# Patient Record
Sex: MALE | Race: WHITE | NOT HISPANIC OR LATINO | Employment: OTHER | URBAN - METROPOLITAN AREA
[De-identification: names, ages, dates, MRNs, and addresses within clinical notes are randomized per-mention and may not be internally consistent; named-entity substitution may affect disease eponyms.]

---

## 2018-06-14 ENCOUNTER — OFFICE VISIT CONVERTED (OUTPATIENT)
Dept: UROLOGY | Facility: CLINIC | Age: 68
End: 2018-06-14
Attending: UROLOGY

## 2018-06-14 ENCOUNTER — CONVERSION ENCOUNTER (OUTPATIENT)
Dept: UROLOGY | Facility: CLINIC | Age: 68
End: 2018-06-14

## 2020-10-22 ENCOUNTER — HOSPITAL ENCOUNTER (OUTPATIENT)
Dept: UROLOGY | Facility: CLINIC | Age: 70
Discharge: HOME | End: 2020-10-22
Attending: UROLOGY

## 2020-10-22 ENCOUNTER — OFFICE VISIT CONVERTED (OUTPATIENT)
Dept: UROLOGY | Facility: CLINIC | Age: 70
End: 2020-10-22
Attending: UROLOGY

## 2020-10-23 LAB — PSA SERPL-MCNC: 3.04 NG/ML (ref 0–4)

## 2020-10-29 ENCOUNTER — HOSPITAL ENCOUNTER (OUTPATIENT)
Dept: OTHER | Facility: HOSPITAL | Age: 70
Discharge: HOME | End: 2020-10-29
Attending: PSYCHIATRY & NEUROLOGY

## 2020-10-29 LAB
25(OH)D3 SERPL-MCNC: 26.6 NG/ML (ref 30–100)
ALBUMIN SERPL-MCNC: 4.4 G/DL (ref 3.5–5)
ALBUMIN/GLOB SERPL: 1.3 {RATIO} (ref 1.4–2.6)
ALP SERPL-CCNC: 106 U/L (ref 56–155)
ALT SERPL-CCNC: 120 U/L (ref 10–40)
ANION GAP SERPL CALC-SCNC: 18 MMOL/L (ref 8–19)
AST SERPL-CCNC: 136 U/L (ref 15–50)
BASOPHILS # BLD AUTO: 0.03 10*3/UL (ref 0–0.2)
BASOPHILS NFR BLD AUTO: 0.5 % (ref 0–3)
BILIRUB SERPL-MCNC: 0.9 MG/DL (ref 0.2–1.3)
BUN SERPL-MCNC: 12 MG/DL (ref 5–25)
BUN/CREAT SERPL: 20 {RATIO} (ref 6–20)
CALCIUM SERPL-MCNC: 9.3 MG/DL (ref 8.7–10.4)
CHLORIDE SERPL-SCNC: 101 MMOL/L (ref 99–111)
CHOLEST SERPL-MCNC: 234 MG/DL (ref 107–200)
CHOLEST/HDLC SERPL: 4.6 {RATIO} (ref 3–6)
CONV ABS IMM GRAN: 0.03 10*3/UL (ref 0–0.2)
CONV CO2: 26 MMOL/L (ref 22–32)
CONV IMMATURE GRAN: 0.5 % (ref 0–1.8)
CONV TOTAL PROTEIN: 7.7 G/DL (ref 6.3–8.2)
CREAT UR-MCNC: 0.6 MG/DL (ref 0.7–1.2)
DEPRECATED RDW RBC AUTO: 48.7 FL (ref 35.1–43.9)
EOSINOPHIL # BLD AUTO: 0.17 10*3/UL (ref 0–0.7)
EOSINOPHIL # BLD AUTO: 2.6 % (ref 0–7)
ERYTHROCYTE [DISTWIDTH] IN BLOOD BY AUTOMATED COUNT: 12 % (ref 11.6–14.4)
EST. AVERAGE GLUCOSE BLD GHB EST-MCNC: 97 MG/DL
GFR SERPLBLD BASED ON 1.73 SQ M-ARVRAT: >60 ML/MIN/{1.73_M2}
GLOBULIN UR ELPH-MCNC: 3.3 G/DL (ref 2–3.5)
GLUCOSE SERPL-MCNC: 85 MG/DL (ref 70–99)
HBA1C MFR BLD: 5 % (ref 3.5–5.7)
HCT VFR BLD AUTO: 55.4 % (ref 42–52)
HDLC SERPL-MCNC: 51 MG/DL (ref 40–60)
HGB BLD-MCNC: 18.9 G/DL (ref 14–18)
LDLC SERPL CALC-MCNC: 155 MG/DL (ref 70–100)
LYMPHOCYTES # BLD AUTO: 1.86 10*3/UL (ref 1–5)
LYMPHOCYTES NFR BLD AUTO: 28.7 % (ref 20–45)
MCH RBC QN AUTO: 36.8 PG (ref 27–31)
MCHC RBC AUTO-ENTMCNC: 34.1 G/DL (ref 33–37)
MCV RBC AUTO: 107.8 FL (ref 80–96)
MONOCYTES # BLD AUTO: 0.62 10*3/UL (ref 0.2–1.2)
MONOCYTES NFR BLD AUTO: 9.6 % (ref 3–10)
NEUTROPHILS # BLD AUTO: 3.77 10*3/UL (ref 2–8)
NEUTROPHILS NFR BLD AUTO: 58.1 % (ref 30–85)
NRBC CBCN: 0 % (ref 0–0.7)
OSMOLALITY SERPL CALC.SUM OF ELEC: 291 MOSM/KG (ref 273–304)
PLATELET # BLD AUTO: 162 10*3/UL (ref 130–400)
PMV BLD AUTO: 10.3 FL (ref 9.4–12.4)
POTASSIUM SERPL-SCNC: 3.8 MMOL/L (ref 3.5–5.3)
RBC # BLD AUTO: 5.14 10*6/UL (ref 4.7–6.1)
SODIUM SERPL-SCNC: 141 MMOL/L (ref 135–147)
T4 FREE SERPL-MCNC: 1 NG/DL (ref 0.9–1.8)
TRIGL SERPL-MCNC: 141 MG/DL (ref 40–150)
TSH SERPL-ACNC: 1.88 M[IU]/L (ref 0.27–4.2)
VLDLC SERPL-MCNC: 28 MG/DL (ref 5–37)
WBC # BLD AUTO: 6.48 10*3/UL (ref 4.8–10.8)

## 2020-10-30 ENCOUNTER — HOSPITAL ENCOUNTER (OUTPATIENT)
Dept: CT IMAGING | Facility: HOSPITAL | Age: 70
Discharge: HOME | End: 2020-10-30
Attending: PSYCHIATRY & NEUROLOGY

## 2020-10-30 LAB
B BURGDOR IGG+IGM SER-ACNC: <0.91 ISR (ref 0–0.9)
DSDNA AB SER-ACNC: NEGATIVE [IU]/ML
ENA AB SER IA-ACNC: NEGATIVE {RATIO}

## 2020-12-17 ENCOUNTER — HOSPITAL ENCOUNTER (OUTPATIENT)
Dept: CARDIOLOGY | Facility: HOSPITAL | Age: 70
Discharge: HOME | End: 2020-12-17
Attending: PSYCHIATRY & NEUROLOGY

## 2021-05-13 NOTE — PROGRESS NOTES
Progress Note      Patient Name: Lacne Brambila   Patient ID: [id number]   Sex: Male   YOB: 1950    Primary Care Provider: [facility]    Visit Date: October 22, 2020    Provider: Lisa Otoole MD   Location: [facility]   Location Address: [address] [place] [address], [address] [place] [address]  [id number]   Location Phone: [phone]          Chief Complaint  · Recheck prostate  · Annual Prostate visit      History Of Present Illness  Lance Brambila is a 70 year old /White male who comes in for a scheduled follow-up visit for BPH.   His last PSA was 2.37 on 6/14/18 , AUA 4/35. Patient has nocturia 2 or 3 times in the stream is medium. Getting up at night does not bother him much. Daytime is doing fine. He has no urgency. Sometimes he has dysuria but no gross hematuria. Nobody in the family has prostate cancer. No history of urinary tract infection. History of kidney stones . Patient has had 2 episodes of syncopal attacks.       Past Medical History  Knee Medial Meniscus Tear; Aftercare following surgery; Left Knee Scope; BPH without urinary obstruction; CAD (coronary artery disease); MI (myocardial infarction); Nocturia; Post Musc/Ske Surgery-Left knee scope; PUD (peptic ulcer disease); Renal calculus or stone         Past Surgical History  Cardiac Catherization; cardiac stents; Colonoscopy; Cystoscopy; Holmium Lasertripsy; Inguinal Hernia Repair         Medication List  aspirin 81 mg oral tablet,delayed release (DR/EC); Fish Oil 1,000 mg (120 mg-180 mg) oral capsule         Allergy List  NO KNOWN DRUG ALLERGIES         Family Medical History  Cancer, Unspecified; Renal Calculus; Family history of certain chronic disabling diseases; arthritis         Social History  Alcohol (Current every day); lives with spouse; ; Recreational Drug Use (Never); Retired [employer]; Tobacco (Former); Unemployed         Review of Systems  · Constitutional  o Denies  o : fever, headache,  "chills  · Eyes  o Denies  o : eye pain, double vision, blurred vision  · HENT  o Denies  o : sinus problems, sore throat, ear infection  · Cardiovascular  o Denies  o : chest pain, high blood pressure, varicosities  · Respiratory  o Denies  o : shortness of breath, wheezing, frequent cough  · Gastrointestinal  o Denies  o : nausea, vomiting, heartburn, indigestion, abdominal pain  · Genitourinary  o Denies  o : urgency, frequency, urinary retention, painful urination  · Integument  o Denies  o : rash, itching, boils  · Neurologic  o Denies  o : tingling or numbness, tremors, dizzy spells  · Musculoskeletal  o Denies  o : joint pain, neck pain, back pain  · Endocrine  o Denies  o : cold intolerance, heat intolerance, tired, excessive thirst, sluggish  · Psychiatric  o Admits  o : feels satisfied with life  o Denies  o : severe depression, concerns with hurting themselves  · Heme-Lymph  o Denies  o : swollen glands, blood clotting problems  · Allergic-Immunologic  o Denies  o : sinus allergy symptoms, hay fever      Vitals  Date Time BP Position Site L\R Cuff Size HR RR TEMP (F) WT  HT  BMI kg/m2 BSA m2 O2 Sat FR L/min FiO2 HC       10/22/2020 09:40 /92 Sitting    97 - R  97.6 190lbs 0oz 5'  10\" 27.26 2.06             Physical Examination  · Constitutional  o Appearance  o : Well nourished, well developed patient in no acute distress. Ambulating without difficulty.  · Neck  o Thyroid  o : gland size normal, nontender, no nodules or masses present on palpation  · Respiratory  o Respiratory Effort  o : breathing unlabored  o Inspection of Chest  o : normal appearance, no retractions  o Auscultation of Lungs  o : normal breath sounds throughout  · Cardiovascular  o Heart  o :   § Auscultation of Heart  § : regular rate and rhythm, no murmurs, gallops or rubs  o Peripheral Vascular System  o : No abnormalities  · Gastrointestinal  o Abdominal Examination  o : abdomen nontender to palpation, tone normal without " rigidity or guarding, no masses present, abdominal contour scaphoid  o Liver and spleen  o : no abnormalities  o Hernias  o : absent   · Genitourinary  o Bladder  o : no abnormalities  o Penis  o : Normal appearance without lesion, discharge or masses.  o Urethral Meatus  o : no abnormalities  o Scrotum and Scrotal Contents  o :   § Scrotum  § : no abnormalities  § Epididymides  § : no abnormalities  § Testes  § : no abnormalities  o Digital Rectal Examination  o :   § Prostate  § : nontender to palpation, size normal, no nodules present, consistency normal  · Lymphatic  o Groin  o : no lymphadenopathy present, normal size  · Skin and Subcutaneous Tissue  o General Inspection  o : no lesions present, no areas of discoloration, skin turgor normal, texture normal  · Neurologic  o Mental Status Examination  o : grossly oriented to person, place and time  o Gait and Station  o : normal gait, able to stand without difficulty  · Psychiatric  o Mood and Affect  o : mood normal, affect appropriate      Figure 1.0: Pain Rating Scale-Creede         Results  · In-Office Procedures  o Lab procedure  § Automated dipstick urinalysis with microscopy (63471)   § Color Ur: Yellow   § Clarity Ur: Clear   § Glucose Ur Ql Strip: Negative   § Bilirub Ur Ql Strip: Negative   § Ketones Ur Ql Strip: Negative   § Sp Gr Ur Qn: 1.020   § Hgb Ur Ql Strip: Trace-Lysed   § pH Ur-LsCnc: 8.0   § Prot Ur Ql Strip: 30 mg/dL   § Urobilinogen Ur Strip-mCnc: 1.0 E.U./dL   § Nitrite Ur Ql Strip: Negative   § WBC Est Ur Ql Strip: Negative   § RBC UrnS Qn HPF: 0   § WBC UrnS Qn HPF: 0   § Bacteria UrnS Qn HPF: 0   § Crystals UrnS Qn HPF: 1-2   § Epithelial Cells (non renal): 0 /HPF  § Epithelial Cells (renal): 0       Assessment  · Prostate Cancer Screening     V76.44/Z12.5  · Renal calculus or stone     592.0/N20.0  · Syncopal episodes     780.2/R55  · Hypertension     401.9/I10      Plan  · Orders  o PSA Screening, Ultrasensitive, MEDICARE [facility] () -  V76.44/Z12.5 - 10/22/2020  · Instructions  o Refer to Dr. West for evaluation of syncopal attacks            Electronically Signed by: Cristal Mars MA -Author on October 22, 2020 11:31:54 AM

## 2021-05-14 VITALS
SYSTOLIC BLOOD PRESSURE: 151 MMHG | HEART RATE: 97 BPM | HEIGHT: 70 IN | DIASTOLIC BLOOD PRESSURE: 92 MMHG | WEIGHT: 190 LBS | TEMPERATURE: 97.6 F | BODY MASS INDEX: 27.2 KG/M2

## 2021-05-16 VITALS
HEART RATE: 79 BPM | HEIGHT: 70 IN | WEIGHT: 198 LBS | TEMPERATURE: 98.4 F | SYSTOLIC BLOOD PRESSURE: 134 MMHG | BODY MASS INDEX: 28.35 KG/M2 | DIASTOLIC BLOOD PRESSURE: 80 MMHG

## 2021-07-19 PROBLEM — R35.1 NOCTURIA: Status: ACTIVE | Noted: 2018-06-14

## 2021-07-19 PROBLEM — N40.0 BPH WITHOUT URINARY OBSTRUCTION: Status: ACTIVE | Noted: 2018-06-14

## 2021-07-19 PROBLEM — R55 SYNCOPAL EPISODES: Status: ACTIVE | Noted: 2020-10-22

## 2021-07-19 PROBLEM — I25.10 ATHEROSCLEROSIS OF CORONARY ARTERY: Status: ACTIVE | Noted: 2021-07-19

## 2021-07-19 PROBLEM — N20.0 RENAL CALCULUS OR STONE: Status: ACTIVE | Noted: 2021-07-19

## 2021-07-19 PROBLEM — K27.9 PUD (PEPTIC ULCER DISEASE): Status: ACTIVE | Noted: 2021-07-19

## 2021-09-15 ENCOUNTER — OFFICE VISIT (OUTPATIENT)
Dept: UROLOGY | Facility: CLINIC | Age: 71
End: 2021-09-15

## 2021-09-15 VITALS
HEART RATE: 73 BPM | TEMPERATURE: 97.3 F | DIASTOLIC BLOOD PRESSURE: 88 MMHG | WEIGHT: 190 LBS | BODY MASS INDEX: 27.2 KG/M2 | SYSTOLIC BLOOD PRESSURE: 159 MMHG | HEIGHT: 70 IN

## 2021-09-15 DIAGNOSIS — N20.0 KIDNEY STONE: ICD-10-CM

## 2021-09-15 DIAGNOSIS — Z53.21 PATIENT LEFT WITHOUT BEING SEEN: ICD-10-CM

## 2021-09-15 DIAGNOSIS — N13.8 BPH WITH OBSTRUCTION/LOWER URINARY TRACT SYMPTOMS: Primary | ICD-10-CM

## 2021-09-15 DIAGNOSIS — N40.1 BPH WITH OBSTRUCTION/LOWER URINARY TRACT SYMPTOMS: Primary | ICD-10-CM

## 2021-09-16 ENCOUNTER — OFFICE VISIT (OUTPATIENT)
Dept: UROLOGY | Facility: CLINIC | Age: 71
End: 2021-09-16

## 2021-09-16 VITALS
DIASTOLIC BLOOD PRESSURE: 86 MMHG | HEIGHT: 70 IN | WEIGHT: 190 LBS | SYSTOLIC BLOOD PRESSURE: 149 MMHG | TEMPERATURE: 97.1 F | HEART RATE: 75 BPM | BODY MASS INDEX: 27.2 KG/M2

## 2021-09-16 DIAGNOSIS — N20.0 RENAL CALCULI: ICD-10-CM

## 2021-09-16 DIAGNOSIS — N13.8 BPH WITH OBSTRUCTION/LOWER URINARY TRACT SYMPTOMS: Primary | ICD-10-CM

## 2021-09-16 DIAGNOSIS — N40.1 BPH WITH OBSTRUCTION/LOWER URINARY TRACT SYMPTOMS: Primary | ICD-10-CM

## 2021-09-16 LAB
BACTERIA UR QL AUTO: NORMAL /HPF
BILIRUB BLD-MCNC: NEGATIVE MG/DL
CLARITY, POC: CLEAR
COLOR UR: YELLOW
EPI CELLS #/AREA URNS HPF: 0 /[HPF]
GLUCOSE UR STRIP-MCNC: NEGATIVE MG/DL
KETONES UR QL: NEGATIVE
LEUKOCYTE EST, POC: NEGATIVE
NITRITE UR-MCNC: NEGATIVE MG/ML
PH UR: 8.5 [PH] (ref 5–8)
PROT UR STRIP-MCNC: NEGATIVE MG/DL
RBC # UR STRIP: ABNORMAL /UL
RBC # UR STRIP: NORMAL /HPF
RENAL EPITHELIAL, POC: 0
SP GR UR: 1.02 (ref 1–1.03)
UNIDENT CRYS URNS QL MICRO: NORMAL /HPF
UROBILINOGEN UR QL: NORMAL
WBC # UR STRIP: NORMAL /HPF

## 2021-09-16 PROCEDURE — 99212 OFFICE O/P EST SF 10 MIN: CPT | Performed by: UROLOGY

## 2021-09-16 PROCEDURE — 81003 URINALYSIS AUTO W/O SCOPE: CPT | Performed by: UROLOGY

## 2021-12-07 DIAGNOSIS — N20.0 RENAL CALCULI: Primary | ICD-10-CM

## 2021-12-08 ENCOUNTER — OFFICE VISIT (OUTPATIENT)
Dept: UROLOGY | Facility: CLINIC | Age: 71
End: 2021-12-08

## 2021-12-08 VITALS
DIASTOLIC BLOOD PRESSURE: 89 MMHG | BODY MASS INDEX: 28.35 KG/M2 | HEIGHT: 70 IN | HEART RATE: 77 BPM | SYSTOLIC BLOOD PRESSURE: 129 MMHG | TEMPERATURE: 97.1 F | WEIGHT: 198 LBS

## 2021-12-08 DIAGNOSIS — N13.8 BPH WITH OBSTRUCTION/LOWER URINARY TRACT SYMPTOMS: ICD-10-CM

## 2021-12-08 DIAGNOSIS — R10.9 RT FLANK PAIN: Primary | ICD-10-CM

## 2021-12-08 DIAGNOSIS — N40.1 BPH WITH OBSTRUCTION/LOWER URINARY TRACT SYMPTOMS: ICD-10-CM

## 2021-12-08 LAB
BACTERIA UR QL AUTO: NORMAL /HPF
BILIRUB BLD-MCNC: NEGATIVE MG/DL
CLARITY, POC: CLEAR
COLOR UR: YELLOW
EPI CELLS #/AREA URNS HPF: 0 /[HPF]
EXPIRATION DATE: ABNORMAL
GLUCOSE UR STRIP-MCNC: NEGATIVE MG/DL
KETONES UR QL: ABNORMAL
LEUKOCYTE EST, POC: NEGATIVE
Lab: ABNORMAL
NITRITE UR-MCNC: NEGATIVE MG/ML
PH UR: 7 [PH] (ref 5–8)
PROT UR STRIP-MCNC: NEGATIVE MG/DL
RBC # UR STRIP: ABNORMAL /UL
RBC # UR STRIP: NORMAL /HPF
RENAL EPITHELIAL, POC: 0
SP GR UR: 1.02 (ref 1–1.03)
UNIDENT CRYS URNS QL MICRO: NORMAL /HPF
UROBILINOGEN UR QL: ABNORMAL
WBC # UR STRIP: NORMAL /HPF

## 2021-12-08 PROCEDURE — 81003 URINALYSIS AUTO W/O SCOPE: CPT | Performed by: UROLOGY

## 2021-12-08 PROCEDURE — 99213 OFFICE O/P EST LOW 20 MIN: CPT | Performed by: UROLOGY

## 2021-12-08 NOTE — PROGRESS NOTES
"Chief Complaint  Flank Pain (RIGHT SIDE )    History of kidney stones    Subjective Patient is uncomfortable        Lance Brambila presents to [facility]  History of Present Illness    71-year-old white male has been having pain in the right flank area for the last 2 days.  Patient feels it is the same pain like he had with  kidney stones.  Pain is 6/10 and has been having nausea but no vomiting.  No gross hematuria.  Patient feels weak and has no problem with urination.  Patient has had 2 episodes of kidney stones in his lifetime.    Objective Patient is uncomfortable  Vital Signs:   /89   Pulse 77   Temp 97.1 °F (36.2 °C)   Ht 177.8 cm (70\")   Wt 89.8 kg (198 lb)   BMI 28.41 kg/m²     No Known Allergies   Review of Systems    Abdominal pains    Physical Exam  Constitutional:       General: He is in acute distress.      Appearance: He is not ill-appearing or toxic-appearing.   HENT:      Head: Normocephalic and atraumatic.   Cardiovascular:      Rate and Rhythm: Normal rate and regular rhythm.      Pulses: Normal pulses.      Heart sounds: Normal heart sounds. No murmur heard.      Pulmonary:      Effort: Pulmonary effort is normal.      Breath sounds: Normal breath sounds. No rhonchi or rales.   Abdominal:      General: There is no distension.      Palpations: Abdomen is soft.      Tenderness: There is no abdominal tenderness. There is no right CVA tenderness or left CVA tenderness.      Comments: Tenderness right paraspinal area   Genitourinary:     Penis: Normal.       Testes: Normal.      Comments: Patient refused prostate examination  Musculoskeletal:         General: No swelling. Normal range of motion.      Cervical back: Normal range of motion and neck supple. No rigidity or tenderness.   Lymphadenopathy:      Cervical: No cervical adenopathy.   Skin:     General: Skin is warm.      Coloration: Skin is not jaundiced.   Neurological:      General: No focal deficit " present.      Mental Status: He is alert and oriented to person, place, and time.      Motor: No weakness.      Gait: Gait normal.   Psychiatric:         Mood and Affect: Mood normal.         Behavior: Behavior normal.         Thought Content: Thought content normal.         Judgment: Judgment normal.        Result Review :{Labs  Result Review  Imaging  Med Tab  Media  Procedures :23}                 Assessment and Plan    Diagnoses and all orders for this visit:    1. Rt flank pain (Primary)  -     POC Urinalysis Dipstick, Automated  -     XR Abdomen 1 View  -     POC Urine Microscopic Only    2. BPH with obstruction/lower urinary tract symptoms  -     POC Urinalysis Dipstick, Automated  -     POC Urine Microscopic Only    I do not see any stone on the KUB and the patient is perimedial in the right paraspinal area.  I have given him a prescription of muscle relaxer Flexeril 10 mg every 8 hours for 10 days.  I will see him after his CAT scan    Follow Up   No follow-ups on file.  Patient was given instructions and counseling regarding his condition or for health maintenance advice. Please see specific information pulled into the AVS if appropriate.     Lisa Otoole MD

## 2022-03-29 ENCOUNTER — APPOINTMENT (OUTPATIENT)
Dept: GENERAL RADIOLOGY | Facility: HOSPITAL | Age: 72
End: 2022-03-29

## 2022-03-29 ENCOUNTER — HOSPITAL ENCOUNTER (EMERGENCY)
Facility: HOSPITAL | Age: 72
Discharge: HOME | End: 2022-03-29
Attending: EMERGENCY MEDICINE | Admitting: EMERGENCY MEDICINE

## 2022-03-29 VITALS
DIASTOLIC BLOOD PRESSURE: 91 MMHG | SYSTOLIC BLOOD PRESSURE: 138 MMHG | RESPIRATION RATE: 18 BRPM | HEART RATE: 79 BPM | BODY MASS INDEX: 27.36 KG/M2 | WEIGHT: 191.14 LBS | HEIGHT: 70 IN | OXYGEN SATURATION: 93 % | TEMPERATURE: 99.3 F

## 2022-03-29 DIAGNOSIS — S81.831A PUNCTURE WOUND OF RIGHT LOWER LEG, INITIAL ENCOUNTER: Primary | ICD-10-CM

## 2022-03-29 DIAGNOSIS — L03.115 CELLULITIS OF RIGHT LOWER LEG: ICD-10-CM

## 2022-03-29 PROCEDURE — 87205 SMEAR GRAM STAIN: CPT

## 2022-03-29 PROCEDURE — 90715 TDAP VACCINE 7 YRS/> IM: CPT

## 2022-03-29 PROCEDURE — 87070 CULTURE OTHR SPECIMN AEROBIC: CPT

## 2022-03-29 PROCEDURE — 99283 EMERGENCY DEPT VISIT LOW MDM: CPT

## 2022-03-29 PROCEDURE — 25010000002 TETANUS-DIPHTH-ACELL PERTUSSIS 5-2.5-18.5 LF-MCG/0.5 SUSPENSION PREFILLED SYRINGE

## 2022-03-29 PROCEDURE — 73590 X-RAY EXAM OF LOWER LEG: CPT

## 2022-03-29 PROCEDURE — 90471 IMMUNIZATION ADMIN: CPT

## 2022-03-29 RX ORDER — CLINDAMYCIN HYDROCHLORIDE 300 MG/1
300 CAPSULE ORAL 4 TIMES DAILY
Qty: 28 CAPSULE | Refills: 0 | Status: SHIPPED | OUTPATIENT
Start: 2022-03-29 | End: 2022-04-05

## 2022-03-29 RX ORDER — IBUPROFEN 400 MG/1
800 TABLET ORAL ONCE
Status: COMPLETED | OUTPATIENT
Start: 2022-03-29 | End: 2022-03-29

## 2022-03-29 RX ADMIN — TETANUS TOXOID, REDUCED DIPHTHERIA TOXOID AND ACELLULAR PERTUSSIS VACCINE, ADSORBED 0.5 ML: 5; 2.5; 8; 8; 2.5 SUSPENSION INTRAMUSCULAR at 07:59

## 2022-03-29 RX ADMIN — IBUPROFEN 800 MG: 400 TABLET, FILM COATED ORAL at 07:59

## 2022-03-31 LAB
BACTERIA SPEC AEROBE CULT: NORMAL
GRAM STN SPEC: NORMAL

## 2022-06-30 ENCOUNTER — OFFICE VISIT (OUTPATIENT)
Dept: GASTROENTEROLOGY | Facility: CLINIC | Age: 72
End: 2022-06-30

## 2022-06-30 VITALS
DIASTOLIC BLOOD PRESSURE: 91 MMHG | WEIGHT: 165 LBS | HEIGHT: 70 IN | BODY MASS INDEX: 23.62 KG/M2 | SYSTOLIC BLOOD PRESSURE: 153 MMHG | HEART RATE: 93 BPM

## 2022-06-30 DIAGNOSIS — Z12.11 COLON CANCER SCREENING: ICD-10-CM

## 2022-06-30 DIAGNOSIS — Z87.898 HISTORY OF DIARRHEA: Primary | ICD-10-CM

## 2022-06-30 PROBLEM — R93.3 ABNORMAL FINDINGS ON DIAGNOSTIC IMAGING OF OTHER PARTS OF DIGESTIVE TRACT: Status: ACTIVE | Noted: 2022-06-30

## 2022-06-30 PROCEDURE — S0260 H&P FOR SURGERY: HCPCS | Performed by: NURSE PRACTITIONER

## 2022-06-30 RX ORDER — POLYETHYLENE GLYCOL 3350, SODIUM CHLORIDE, SODIUM BICARBONATE, POTASSIUM CHLORIDE 420; 11.2; 5.72; 1.48 G/4L; G/4L; G/4L; G/4L
4000 POWDER, FOR SOLUTION ORAL ONCE
Qty: 4000 ML | Refills: 0 | Status: SHIPPED | OUTPATIENT
Start: 2022-06-30 | End: 2022-06-30

## 2022-06-30 NOTE — PROGRESS NOTES
Patient Name: Lance Brambila   Visit Date: 06/30/2022   Patient ID: [id number]  Provider: RAMON Ernandez    Sex: male  Location:  Location Address:  Location Phone: [address] [facility] [address]  [place] [address] [phone]    YOB: 1950      Primary Care Provider Abdi Souza MD      Referring Provider: Abdi Souza MD        Chief Complaint  Diarrhea    History of Present Illness  Lance Brambila is a 71 y.o. who presents to [facility] on referral from Abdi Souza MD for a gastroenterology evaluation of Diarrhea.    Mr. Brambila reports having diarrhea several times a day beginning early in May.  Patient reports he would even wake up in the middle of the night in order to have a BM.  Recalls having a piece of glass stuck in his right lower leg during this time and had to receive a tetanus shot and was also treated with antibiotics.  The diarrhea has since resolved and he is now having a bowel movement 1-2 times a day, formed stool.  Denies any abdominal pain, hematochezia, or melena.  Last colonoscopy was in 2007 at [facility], requesting records.  Patient reports the scope was normal.    Labs Result Review Imaging    Past Medical History:   Diagnosis Date   • Aftercare 08/03/2015    FOLLOWING LEFT KNEE SCOPE   • BPH without urinary obstruction 06/14/2018   • CAD (coronary artery disease)    • Current tear knee, medial meniscus 07/14/2015   • MI (myocardial infarction) ([facility]) 03/2006   • Nocturia 06/14/2018   • PUD (peptic ulcer disease)    • Renal calculus or stone    • Status post musculoskeletal system surgery 09/18/2015    LEFT KNEE SCOPE   • Syncopal episodes 10/22/2020       Past Surgical History:   Procedure Laterality Date   • CARDIAC CATHETERIZATION     • COLONOSCOPY  2007   • CORONARY ANGIOPLASTY WITH STENT PLACEMENT     • CYSTOSCOPY     • INGUINAL HERNIA REPAIR     • PROSTATE LASER ABLATION/ENUCLEATION      HOLMIUM  "LITHOTRIPSY         Current Outpatient Medications:   •  aspirin 81 MG EC tablet, 25 mg., Disp: , Rfl:   •  metoprolol succinate XL (TOPROL-XL) 25 MG 24 hr tablet, Take 25 mg by mouth 2 (Two) Times a Day., Disp: , Rfl:   •  polyethylene glycol-electrolytes (Nulytely with Flavor Packs) 420 g solution, Take 4,000 mL by mouth 1 (One) Time for 1 dose., Disp: 4000 mL, Rfl: 0     No Known Allergies    Family History   Problem Relation Age of Onset   • Kidney nephrosis Mother    • Arthritis Mother    • Cancer Father         Social History     Social History Narrative   • Not on file         Objective     Review of Systems   Gastrointestinal: Positive for diarrhea. Negative for abdominal pain, anal bleeding and blood in stool.        Vital Signs:   /91 (BP Location: Right arm, Patient Position: Sitting, Cuff Size: Small Adult)   Pulse 93   Ht 177.8 cm (70\")   Wt 74.8 kg (165 lb)   BMI 23.68 kg/m²       Physical Exam  Constitutional:       General: He is not in acute distress.     Appearance: Normal appearance. He is well-developed and normal weight.   HENT:      Head: Normocephalic and atraumatic.   Eyes:      Conjunctiva/sclera: Conjunctivae normal.      Pupils: Pupils are equal, round, and reactive to light.      Visual Fields: Right eye visual fields normal and left eye visual fields normal.   Cardiovascular:      Rate and Rhythm: Normal rate and regular rhythm.      Heart sounds: Normal heart sounds.   Pulmonary:      Effort: Pulmonary effort is normal. No retractions.      Breath sounds: Normal breath sounds and air entry.   Abdominal:      General: Bowel sounds are normal. There is no distension.      Palpations: Abdomen is soft.      Tenderness: There is no abdominal tenderness.      Comments: No appreciable hepatosplenomegaly or ascites   Musculoskeletal:         General: Normal range of motion.      Cervical back: Normal range of motion and neck supple.   Skin:     General: Skin is warm and dry. "   Neurological:      Mental Status: He is alert and oriented to person, place, and time.   Psychiatric:         Mood and Affect: Mood and affect normal.         Behavior: Behavior normal.         Result Review :   The following data was reviewed by: RAMON Ernandez on 06/30/2022:      ds DNA Antibody   Date Value Ref Range Status   10/29/2020 NEGATIVE [IU]/mL Final             Assessment and Plan    Diagnoses and all orders for this visit:    1. History of diarrhea (Primary)  -     Case Request; Standing  -     Case Request    2. Colon cancer screening  -     Case Request; Standing  -     Case Request    Other orders  -     Follow Anesthesia Guidelines / Protocol; Future  -     Obtain Informed Consent; Future  -     polyethylene glycol-electrolytes (Nulytely with Flavor Packs) 420 g solution; Take 4,000 mL by mouth 1 (One) Time for 1 dose.  Dispense: 4000 mL; Refill: 0      COLONOSCOPY (N/A)       Follow Up   Return for Follow up after procedure.  Patient was given instructions and counseling regarding his condition or for health maintenance advice. Please see specific information pulled into the AVS if appropriate.

## 2022-10-25 ENCOUNTER — TELEPHONE (OUTPATIENT)
Dept: GASTROENTEROLOGY | Facility: CLINIC | Age: 72
End: 2022-10-25

## 2023-03-08 ENCOUNTER — TELEPHONE (OUTPATIENT)
Dept: GASTROENTEROLOGY | Facility: CLINIC | Age: 73
End: 2023-03-08
Payer: MEDICARE

## 2023-03-23 ENCOUNTER — TELEPHONE (OUTPATIENT)
Dept: GASTROENTEROLOGY | Facility: CLINIC | Age: 73
End: 2023-03-23
Payer: MEDICARE

## 2023-04-14 ENCOUNTER — PREP FOR SURGERY (OUTPATIENT)
Dept: OTHER | Facility: HOSPITAL | Age: 73
End: 2023-04-14
Payer: MEDICARE

## 2023-08-11 ENCOUNTER — OFFICE VISIT (OUTPATIENT)
Dept: CARDIOLOGY | Facility: CLINIC | Age: 73
End: 2023-08-11
Payer: MEDICARE

## 2023-08-11 VITALS
SYSTOLIC BLOOD PRESSURE: 149 MMHG | DIASTOLIC BLOOD PRESSURE: 80 MMHG | BODY MASS INDEX: 28.03 KG/M2 | HEART RATE: 76 BPM | WEIGHT: 195.8 LBS | HEIGHT: 70 IN

## 2023-08-11 DIAGNOSIS — Z98.61 CAD S/P PERCUTANEOUS CORONARY ANGIOPLASTY: Primary | ICD-10-CM

## 2023-08-11 DIAGNOSIS — I10 ESSENTIAL HYPERTENSION: ICD-10-CM

## 2023-08-11 DIAGNOSIS — F17.200 SMOKING: ICD-10-CM

## 2023-08-11 DIAGNOSIS — E78.2 MIXED DYSLIPIDEMIA: ICD-10-CM

## 2023-08-11 DIAGNOSIS — I25.10 CAD S/P PERCUTANEOUS CORONARY ANGIOPLASTY: Primary | ICD-10-CM

## 2023-08-11 PROCEDURE — 99204 OFFICE O/P NEW MOD 45 MIN: CPT | Performed by: INTERNAL MEDICINE

## 2023-08-11 RX ORDER — METOPROLOL SUCCINATE 25 MG/1
25 TABLET, EXTENDED RELEASE ORAL DAILY
Qty: 90 TABLET | Refills: 3 | Status: SHIPPED | OUTPATIENT
Start: 2023-08-11

## 2023-08-11 NOTE — PROGRESS NOTES
Chief Complaint  Coronary Artery Disease      History of Present Illness  Lance Brambila presents to [facility]    This is a very pleasant 72-year-old gentleman with past medical history significant for recent myocardial infarction, status post PCI on May 24 presents to clinic to establish cardiology care.  Hospitalization records are not available to me at this time.  He has been doing well since hospital discharge.  He denies recurrent chest pain.  He has no dyspnea, palpitations, dizziness, presyncope or syncope.  He has been compliant with his medications including aspirin and Brilinta without side effects.  He quit smoking since his heart attack.      Past Medical History:   Diagnosis Date    Aftercare 08/03/2015    FOLLOWING LEFT KNEE SCOPE    BPH without urinary obstruction 06/14/2018    CAD (coronary artery disease)     Current tear knee, medial meniscus 07/14/2015    MI (myocardial infarction) 03/2006    Nocturia 06/14/2018    PUD (peptic ulcer disease)     Renal calculus or stone     Status post musculoskeletal system surgery 09/18/2015    LEFT KNEE SCOPE    Syncopal episodes 10/22/2020         Current Outpatient Medications:     aspirin 81 MG EC tablet, 25 mg., Disp: , Rfl:     losartan (COZAAR) 25 MG tablet, Take 1 tablet by mouth Daily., Disp: , Rfl:     Multivitamin tablet tablet, Take 1 tablet by mouth Daily., Disp: , Rfl:     rosuvastatin (CRESTOR) 20 MG tablet, Daily., Disp: , Rfl:     ticagrelor (BRILINTA) 90 MG tablet tablet, Every 12 (Twelve) Hours., Disp: , Rfl:     metoprolol succinate XL (TOPROL-XL) 25 MG 24 hr tablet, Take 1 tablet by mouth Daily., Disp: 90 tablet, Rfl: 3    Medications Discontinued During This Encounter   Medication Reason    cephalexin (KEFLEX) 500 MG capsule *Therapy completed    predniSONE (DELTASONE) 20 MG tablet *Therapy completed     No Known Allergies     Social History     Tobacco Use    Smoking status: Former    Smokeless tobacco:  "Never    Tobacco comments:     SMOKED FROM AGE 20 TO 40 FOR 21-30 YEARS   Vaping Use    Vaping Use: Never used   Substance Use Topics    Alcohol use: Yes     Alcohol/week: 12.0 standard drinks     Types: 12 Cans of beer per week     Comment: PER WEEK    Drug use: Never       Family History   Problem Relation Age of Onset    Kidney nephrosis Mother     Arthritis Mother     Cancer Father         Objective     /80   Pulse 76   Ht 177.8 cm (70\")   Wt 88.8 kg (195 lb 12.8 oz)   BMI 28.09 kg/mý       Physical Exam  Constitutional:       General: Awake. Not in acute distress.     Appearance: Normal appearance.   Neck:      Vascular: No carotid bruit, hepatojugular reflux or JVD.   Cardiovascular:      Rate and Rhythm: Normal rate and regular rhythm.      Chest Wall: PMI is not displaced.      Heart sounds: Normal heart sounds, S1 normal and S2 normal. No murmur heard.   No friction rub. No gallop. No S3 or S4 sounds.    Pulmonary:      Effort: Pulmonary effort is normal.      Breath sounds: Normal breath sounds. No wheezing, rhonchi or rales.   Ext.      Right lower leg: No edema.      Left lower leg: No edema.   Skin:     General: Skin is warm and dry.      Coloration: Skin is not cyanotic.      Findings: No petechiae or rash.   Neurological:      Mental Status: Alert and oriented x 3  Psychiatric:         Behavior: Behavior is cooperative.       Result Review :     No results found for: PROBNP       Lab Results   Component Value Date    TSH 1.880 10/29/2020      Lab Results   Component Value Date    FREET4 1.0 10/29/2020      No results found for: DDIMERQUANT  No results found for: MG   No results found for: DIGOXIN   No results found for: TROPONINT   No results found for: POCTROP(                   No results found for this or any previous visit.                Diagnoses and all orders for this visit:    1. CAD S/P percutaneous coronary angioplasty (Primary)  -     Lipid Panel; Future    2. Essential " hypertension  -     metoprolol succinate XL (TOPROL-XL) 25 MG 24 hr tablet; Take 1 tablet by mouth Daily.  Dispense: 90 tablet; Refill: 3    3. Mixed dyslipidemia    4. Smoking      Assessment:    Recent myocardial infarction, status post PCI on May 24.  Records will be requested and reviewed.  He has been doing well without recurrent chest discomfort or other cardiac symptoms.  His blood pressure is mildly elevated today.  He will be started on metoprolol XL 25 mg daily.  He will be continued on aspirin and ticagrelor for at least 1 year followed by P2 Y12 inhibitor for life.  Continue rosuvastatin and losartan.  Lipid profile will be checked to ensure adequate control.  He recently quit smoking and was encouraged to remain quit.        Follow Up       Return in about 6 months (around 2/11/2024) for with Dr Mathew.    Patient was given instructions and counseling regarding his condition or for health maintenance advice. Please see specific information pulled into the AVS if appropriate.

## 2024-01-30 DIAGNOSIS — I10 ESSENTIAL HYPERTENSION: ICD-10-CM

## 2024-01-30 RX ORDER — METOPROLOL SUCCINATE 25 MG/1
25 TABLET, EXTENDED RELEASE ORAL DAILY
Qty: 90 TABLET | Refills: 3 | Status: CANCELLED | OUTPATIENT
Start: 2024-01-30

## 2024-01-30 NOTE — TELEPHONE ENCOUNTER
Caller: ANANTH    Relationship: SELF    Best call back number: [phone]    Requested Prescriptions:   Requested Prescriptions     Pending Prescriptions Disp Refills    aspirin 81 MG EC tablet       Si tablet.    losartan (COZAAR) 25 MG tablet       Sig: Take 1 tablet by mouth Daily.    metoprolol succinate XL (TOPROL-XL) 25 MG 24 hr tablet 90 tablet 3     Sig: Take 1 tablet by mouth Daily.    rosuvastatin (CRESTOR) 20 MG tablet 90 tablet      Sig: Daily.    ticagrelor (BRILINTA) 90 MG tablet tablet 60 tablet      Sig: Every 12 (Twelve) Hours.    Multivitamin tablet tablet 30 tablet      Sig: Take 1 tablet by mouth Daily.        Pharm[facility] where [facility] be sent[id number] [place] [facility] [address] AT [facility] [phone] [facility] [phone] FX     Last office visit with prescribing clinician: 2023   Last telemedicine visit with prescribing clinician: Visit date not found   Next office visit with prescribing clinician: 2024     Additional details provided by patient: 30 DAYS SUPPLY PLEASE. PATIENT NO LONGER HAS PCP OFFICE.    Does the patient have less than a 3 day supply:  [x] Yes  [] No    Would you like a call back once the refill request has been completed: [] Yes [] No    If the office needs to give you a call back, can they leave a voicemail: [] Yes [] No    Renaldo Mauricio   24 12:38 EST

## 2024-01-31 RX ORDER — ROSUVASTATIN CALCIUM 20 MG/1
20 TABLET, COATED ORAL NIGHTLY
Qty: 90 TABLET | Refills: 3 | Status: SHIPPED | OUTPATIENT
Start: 2024-01-31

## 2024-01-31 RX ORDER — MULTIVITAMIN
1 TABLET ORAL DAILY
Qty: 30 TABLET | Refills: 3 | Status: SHIPPED | OUTPATIENT
Start: 2024-01-31

## 2024-01-31 RX ORDER — ASPIRIN 81 MG/1
TABLET ORAL
Qty: 30 TABLET | Refills: 3 | Status: SHIPPED | OUTPATIENT
Start: 2024-01-31

## 2024-01-31 RX ORDER — LOSARTAN POTASSIUM 25 MG/1
25 TABLET ORAL DAILY
Qty: 90 TABLET | Refills: 3 | Status: SHIPPED | OUTPATIENT
Start: 2024-01-31

## 2024-02-12 ENCOUNTER — OFFICE VISIT (OUTPATIENT)
Dept: CARDIOLOGY | Facility: CLINIC | Age: 74
End: 2024-02-12
Payer: MEDICARE

## 2024-02-12 VITALS
BODY MASS INDEX: 28.4 KG/M2 | DIASTOLIC BLOOD PRESSURE: 78 MMHG | WEIGHT: 198.4 LBS | HEART RATE: 100 BPM | HEIGHT: 70 IN | SYSTOLIC BLOOD PRESSURE: 120 MMHG

## 2024-02-12 DIAGNOSIS — E78.2 MIXED DYSLIPIDEMIA: ICD-10-CM

## 2024-02-12 DIAGNOSIS — Z98.61 CAD S/P PERCUTANEOUS CORONARY ANGIOPLASTY: Primary | ICD-10-CM

## 2024-02-12 DIAGNOSIS — I10 ESSENTIAL HYPERTENSION: ICD-10-CM

## 2024-02-12 DIAGNOSIS — I25.10 CAD S/P PERCUTANEOUS CORONARY ANGIOPLASTY: Primary | ICD-10-CM

## 2024-02-12 DIAGNOSIS — F17.200 SMOKING: ICD-10-CM

## 2024-02-12 PROCEDURE — 99214 OFFICE O/P EST MOD 30 MIN: CPT | Performed by: INTERNAL MEDICINE

## 2024-02-16 ENCOUNTER — HOSPITAL ENCOUNTER (EMERGENCY)
Facility: HOSPITAL | Age: 74
Discharge: HOME | End: 2024-02-16
Attending: EMERGENCY MEDICINE
Payer: MEDICARE

## 2024-02-16 ENCOUNTER — APPOINTMENT (OUTPATIENT)
Dept: CT IMAGING | Facility: HOSPITAL | Age: 74
End: 2024-02-16
Payer: MEDICARE

## 2024-02-16 VITALS
OXYGEN SATURATION: 94 % | TEMPERATURE: 98.1 F | DIASTOLIC BLOOD PRESSURE: 93 MMHG | WEIGHT: 200.62 LBS | RESPIRATION RATE: 18 BRPM | HEIGHT: 70 IN | HEART RATE: 103 BPM | SYSTOLIC BLOOD PRESSURE: 108 MMHG | BODY MASS INDEX: 28.72 KG/M2

## 2024-02-16 DIAGNOSIS — K56.41 FECAL IMPACTION: Primary | ICD-10-CM

## 2024-02-16 DIAGNOSIS — K59.00 CONSTIPATION, UNSPECIFIED CONSTIPATION TYPE: ICD-10-CM

## 2024-02-16 LAB
ALBUMIN SERPL-MCNC: 4.2 G/DL (ref 3.5–5.2)
ALBUMIN/GLOB SERPL: 1.2 G/DL
ALP SERPL-CCNC: 84 U/L (ref 39–117)
ALT SERPL W P-5'-P-CCNC: 84 U/L (ref 1–41)
ANION GAP SERPL CALCULATED.3IONS-SCNC: 16.3 MMOL/L (ref 5–15)
AST SERPL-CCNC: 89 U/L (ref 1–40)
BASOPHILS # BLD AUTO: 0.03 10*3/MM3 (ref 0–0.2)
BASOPHILS NFR BLD AUTO: 0.4 % (ref 0–1.5)
BILIRUB SERPL-MCNC: 1 MG/DL (ref 0–1.2)
BUN SERPL-MCNC: 15 MG/DL (ref 8–23)
BUN/CREAT SERPL: 19.5 (ref 7–25)
CALCIUM SPEC-SCNC: 9.5 MG/DL (ref 8.6–10.5)
CHLORIDE SERPL-SCNC: 102 MMOL/L (ref 98–107)
CO2 SERPL-SCNC: 21.7 MMOL/L (ref 22–29)
CREAT SERPL-MCNC: 0.77 MG/DL (ref 0.76–1.27)
D-LACTATE SERPL-SCNC: 3.4 MMOL/L (ref 0.5–2)
DEPRECATED RDW RBC AUTO: 45.2 FL (ref 37–54)
EGFRCR SERPLBLD CKD-EPI 2021: 95.7 ML/MIN/1.73
EOSINOPHIL # BLD AUTO: 0.16 10*3/MM3 (ref 0–0.4)
EOSINOPHIL NFR BLD AUTO: 2.1 % (ref 0.3–6.2)
ERYTHROCYTE [DISTWIDTH] IN BLOOD BY AUTOMATED COUNT: 11.9 % (ref 12.3–15.4)
GLOBULIN UR ELPH-MCNC: 3.6 GM/DL
GLUCOSE SERPL-MCNC: 138 MG/DL (ref 65–99)
HCT VFR BLD AUTO: 49 % (ref 37.5–51)
HEMOCCULT STL QL IA: POSITIVE
HGB BLD-MCNC: 16.6 G/DL (ref 13–17.7)
HOLD SPECIMEN: NORMAL
HOLD SPECIMEN: NORMAL
IMM GRANULOCYTES # BLD AUTO: 0.03 10*3/MM3 (ref 0–0.05)
IMM GRANULOCYTES NFR BLD AUTO: 0.4 % (ref 0–0.5)
LIPASE SERPL-CCNC: 19 U/L (ref 13–60)
LYMPHOCYTES # BLD AUTO: 1.8 10*3/MM3 (ref 0.7–3.1)
LYMPHOCYTES NFR BLD AUTO: 23.9 % (ref 19.6–45.3)
MCH RBC QN AUTO: 34.7 PG (ref 26.6–33)
MCHC RBC AUTO-ENTMCNC: 33.9 G/DL (ref 31.5–35.7)
MCV RBC AUTO: 102.3 FL (ref 79–97)
MONOCYTES # BLD AUTO: 0.65 10*3/MM3 (ref 0.1–0.9)
MONOCYTES NFR BLD AUTO: 8.6 % (ref 5–12)
NEUTROPHILS NFR BLD AUTO: 4.86 10*3/MM3 (ref 1.7–7)
NEUTROPHILS NFR BLD AUTO: 64.6 % (ref 42.7–76)
NRBC BLD AUTO-RTO: 0 /100 WBC (ref 0–0.2)
PLATELET # BLD AUTO: 167 10*3/MM3 (ref 140–450)
PMV BLD AUTO: 10.4 FL (ref 6–12)
POTASSIUM SERPL-SCNC: 3.4 MMOL/L (ref 3.5–5.2)
PROT SERPL-MCNC: 7.8 G/DL (ref 6–8.5)
RBC # BLD AUTO: 4.79 10*6/MM3 (ref 4.14–5.8)
SODIUM SERPL-SCNC: 140 MMOL/L (ref 136–145)
WBC NRBC COR # BLD AUTO: 7.53 10*3/MM3 (ref 3.4–10.8)
WHOLE BLOOD HOLD COAG: NORMAL
WHOLE BLOOD HOLD SPECIMEN: NORMAL

## 2024-02-16 PROCEDURE — 83690 ASSAY OF LIPASE: CPT | Performed by: EMERGENCY MEDICINE

## 2024-02-16 PROCEDURE — 80053 COMPREHEN METABOLIC PANEL: CPT | Performed by: EMERGENCY MEDICINE

## 2024-02-16 PROCEDURE — 25810000003 SODIUM CHLORIDE 0.9 % SOLUTION: Performed by: EMERGENCY MEDICINE

## 2024-02-16 PROCEDURE — 96376 TX/PRO/DX INJ SAME DRUG ADON: CPT

## 2024-02-16 PROCEDURE — 82274 ASSAY TEST FOR BLOOD FECAL: CPT | Performed by: EMERGENCY MEDICINE

## 2024-02-16 PROCEDURE — 25010000002 HYDROMORPHONE 1 MG/ML SOLUTION: Performed by: EMERGENCY MEDICINE

## 2024-02-16 PROCEDURE — 25010000002 ONDANSETRON PER 1 MG: Performed by: EMERGENCY MEDICINE

## 2024-02-16 PROCEDURE — 25510000001 IOPAMIDOL PER 1 ML: Performed by: EMERGENCY MEDICINE

## 2024-02-16 PROCEDURE — 85025 COMPLETE CBC W/AUTO DIFF WBC: CPT | Performed by: EMERGENCY MEDICINE

## 2024-02-16 PROCEDURE — 96374 THER/PROPH/DIAG INJ IV PUSH: CPT

## 2024-02-16 PROCEDURE — 74177 CT ABD & PELVIS W/CONTRAST: CPT

## 2024-02-16 PROCEDURE — 83605 ASSAY OF LACTIC ACID: CPT | Performed by: EMERGENCY MEDICINE

## 2024-02-16 PROCEDURE — 99285 EMERGENCY DEPT VISIT HI MDM: CPT

## 2024-02-16 PROCEDURE — 96375 TX/PRO/DX INJ NEW DRUG ADDON: CPT

## 2024-02-16 RX ORDER — ONDANSETRON 2 MG/ML
4 INJECTION INTRAMUSCULAR; INTRAVENOUS ONCE
Status: COMPLETED | OUTPATIENT
Start: 2024-02-16 | End: 2024-02-16

## 2024-02-16 RX ORDER — SODIUM CHLORIDE 0.9 % (FLUSH) 0.9 %
10 SYRINGE (ML) INJECTION AS NEEDED
Status: DISCONTINUED | OUTPATIENT
Start: 2024-02-16 | End: 2024-02-16 | Stop reason: HOSPADM

## 2024-02-16 RX ORDER — DOCUSATE SODIUM 100 MG/1
100 CAPSULE, LIQUID FILLED ORAL 2 TIMES DAILY PRN
Qty: 30 CAPSULE | Refills: 0 | Status: SHIPPED | OUTPATIENT
Start: 2024-02-16

## 2024-02-16 RX ORDER — POLYETHYLENE GLYCOL 3350 17 G/17G
17 POWDER, FOR SOLUTION ORAL DAILY
Qty: 519 G | Refills: 0 | Status: SHIPPED | OUTPATIENT
Start: 2024-02-16

## 2024-02-16 RX ADMIN — IOPAMIDOL 100 ML: 755 INJECTION, SOLUTION INTRAVENOUS at 11:25

## 2024-02-16 RX ADMIN — HYDROMORPHONE HYDROCHLORIDE 1 MG: 1 INJECTION, SOLUTION INTRAMUSCULAR; INTRAVENOUS; SUBCUTANEOUS at 12:20

## 2024-02-16 RX ADMIN — HYDROMORPHONE HYDROCHLORIDE 0.5 MG: 1 INJECTION, SOLUTION INTRAMUSCULAR; INTRAVENOUS; SUBCUTANEOUS at 14:11

## 2024-02-16 RX ADMIN — ONDANSETRON 4 MG: 2 INJECTION INTRAMUSCULAR; INTRAVENOUS at 12:20

## 2024-02-16 RX ADMIN — SODIUM CHLORIDE 1000 ML: 9 INJECTION, SOLUTION INTRAVENOUS at 12:21

## 2024-02-16 NOTE — DISCHARGE INSTRUCTIONS
Drink plenty of fluids.  Increase fiber in your diet.  Take medications as directed.  Return for worsening symptoms.  Follow-up with your doctor on Monday if no better.

## 2024-02-16 NOTE — ED PROVIDER NOTES
Time: 11:01 AM EST  Date of encounter:  2/16/2024  Independent Historian/Clinical History and Information was obtained by:   Patient and Family    History is limited by: N/A    Chief Complaint: Lower abdominal pain, constipation, blood in stool.      History of Present Illness:  Patient is a 73 y.o. year old male who presents to the emergency department for evaluation of lower abdominal pain, constipation, blood in stool.  This patient states he has had the symptoms for several days and complained of some blood in the stool after he was straining at the stool.  He has had no nausea vomiting fever or other acute complaints.    HPI    Patient Care Team  Primary Care Provider: Provider, No Known    Past Medical History:     No Known Allergies  Past Medical History:   Diagnosis Date    Aftercare 08/03/2015    FOLLOWING LEFT KNEE SCOPE    BPH without urinary obstruction 06/14/2018    CAD (coronary artery disease)     Current tear knee, medial meniscus 07/14/2015    MI (myocardial infarction) 03/2006    Nocturia 06/14/2018    PUD (peptic ulcer disease)     Renal calculus or stone     Status post musculoskeletal system surgery 09/18/2015    LEFT KNEE SCOPE    Syncopal episodes 10/22/2020     Past Surgical History:   Procedure Laterality Date    CARDIAC CATHETERIZATION      COLONOSCOPY  2007    CORONARY ANGIOPLASTY WITH STENT PLACEMENT      CYSTOSCOPY      INGUINAL HERNIA REPAIR      PROSTATE LASER ABLATION/ENUCLEATION      HOLMIUM LITHOTRIPSY     Family History   Problem Relation Age of Onset    Kidney nephrosis Mother     Arthritis Mother     Cancer Father        Home Medications:  Prior to Admission medications    Medication Sig Start Date End Date Taking? Authorizing Provider   aspirin 81 MG EC tablet One tablet daile 1/31/24   Kathy Casas APRN   losartan (COZAAR) 25 MG tablet Take 1 tablet by mouth Daily. 1/31/24   Kathy Casas APRN   metoprolol succinate XL (TOPROL-XL) 25 MG 24 hr tablet  "Take 1 tablet by mouth Daily. 8/11/23   Fabian Mathew MD   Multivitamin tablet tablet Take 1 tablet by mouth Daily. 1/31/24   Kathy Casas APRN   rosuvastatin (CRESTOR) 20 MG tablet Take 1 tablet by mouth Every Night. 1/31/24   Kathy Casas APRN   ticagrelor (BRILINTA) 90 MG tablet tablet Take 1 tablet by mouth Every 12 (Twelve) Hours. 1/31/24   Kathy Casas APRN        Social History:   Social History     Tobacco Use    Smoking status: Former    Smokeless tobacco: Never    Tobacco comments:     SMOKED FROM AGE 20 TO 40 FOR 21-30 YEARS   Vaping Use    Vaping Use: Never used   Substance Use Topics    Alcohol use: Yes     Alcohol/week: 12.0 standard drinks of alcohol     Types: 12 Cans of beer per week     Comment: PER WEEK    Drug use: Never         Review of Systems:  Review of Systems   Constitutional:  Negative for chills and fever.   HENT:  Negative for congestion, ear pain and sore throat.    Eyes:  Negative for pain.   Respiratory:  Negative for cough, chest tightness and shortness of breath.    Cardiovascular:  Negative for chest pain.   Gastrointestinal:  Positive for abdominal pain, anal bleeding, blood in stool and constipation. Negative for diarrhea, nausea and vomiting.   Genitourinary:  Negative for flank pain and hematuria.   Musculoskeletal:  Negative for joint swelling.   Skin:  Negative for pallor.   Neurological:  Negative for seizures and headaches.   Hematological: Negative.    Psychiatric/Behavioral: Negative.     All other systems reviewed and are negative.       Physical Exam:  /93   Pulse 103   Temp 98.1 °F (36.7 °C)   Resp 18   Ht 177.8 cm (70\")   Wt 91 kg (200 lb 9.9 oz)   SpO2 94%   BMI 28.79 kg/m²     Physical Exam  Vitals and nursing note reviewed.   Constitutional:       General: He is not in acute distress.     Appearance: Normal appearance. He is not toxic-appearing.   HENT:      Head: Normocephalic and atraumatic.      " Mouth/Throat:      Mouth: Mucous membranes are moist.   Eyes:      General: No scleral icterus.  Cardiovascular:      Rate and Rhythm: Normal rate and regular rhythm.      Pulses: Normal pulses.      Heart sounds: Normal heart sounds.   Pulmonary:      Effort: Pulmonary effort is normal. No respiratory distress.      Breath sounds: Normal breath sounds.   Abdominal:      General: Abdomen is flat.      Palpations: Abdomen is soft.      Tenderness: There is no abdominal tenderness.   Genitourinary:     Rectum: No mass or tenderness.      Comments: The patient has a fecal impaction on rectal exam and I was able to disimpact a large amount of stool.  Musculoskeletal:         General: Normal range of motion.      Cervical back: Normal range of motion and neck supple.   Skin:     General: Skin is warm and dry.   Neurological:      Mental Status: He is alert and oriented to person, place, and time. Mental status is at baseline.                  Procedures:  Procedures      Medical Decision Making:      Comorbidities that affect care:    Obesity    External Notes reviewed:    Previous Clinic Note: Cardiology clinic note after PTCA of coronary artery      The following orders were placed and all results were independently analyzed by me:  Orders Placed This Encounter   Procedures    CT Abdomen Pelvis With Contrast    [place] Draw    Comprehensive Metabolic Panel    Lipase    Urinalysis With Microscopic If Indicated (No Culture) - Urine, Clean Catch    Lactic Acid, Plasma    Occult Blood, Fecal By Immunoassay - Stool, Per Rectum    CBC Auto Differential    STAT Lactic Acid, Reflex    NPO Diet NPO Type: Strict NPO    Undress & Gown    Soap suds enema    Insert Peripheral IV    CBC & Differential    Green Top (Gel)    Lavender Top    Gold Top - SST    Light Blue Top       Medications Given in the Emergency Department:  Medications   sodium chloride 0.9 % flush 10 mL (has no administration in time range)   sodium chloride 0.9 %  bolus 1,000 mL (0 mL Intravenous Stopped 2/16/24 1440)   HYDROmorphone (DILAUDID) injection 1 mg (1 mg Intravenous Given 2/16/24 1220)   ondansetron (ZOFRAN) injection 4 mg (4 mg Intravenous Given 2/16/24 1220)   iopamidol (ISOVUE-370) 76 % injection 100 mL (100 mL Intravenous Given 2/16/24 1125)   HYDROmorphone (DILAUDID) injection 0.5 mg (0.5 mg Intravenous Given 2/16/24 1411)        ED Course:         Labs:    Lab Results (last 24 hours)       Procedure Component Value Units Date/Time    CBC & Differential [[id number]]  (Abnormal) Collected: 02/16/24 0958    Specimen: Blood Updated: 02/16/24 1012    Narrative:      The following orders were created for panel order CBC & Differential.  Procedure                               Abnormality         Status                     ---------                               -----------         ------                     CBC Auto Differential[[id number]]        Abnormal            Final result                 Please view results for these tests on the individual orders.    Comprehensive Metabolic Panel [[id number]]  (Abnormal) Collected: 02/16/24 0958    Specimen: Blood Updated: 02/16/24 1028     Glucose 138 mg/dL      BUN 15 mg/dL      Creatinine 0.77 mg/dL      Sodium 140 mmol/L      Potassium 3.4 mmol/L      Chloride 102 mmol/L      CO2 21.7 mmol/L      Calcium 9.5 mg/dL      Total Protein 7.8 g/dL      Albumin 4.2 g/dL      ALT (SGPT) 84 U/L      AST (SGOT) 89 U/L      Alkaline Phosphatase 84 U/L      Total Bilirubin 1.0 mg/dL      Globulin 3.6 gm/dL      A/G Ratio 1.2 g/dL      BUN/Creatinine Ratio 19.5     Anion Gap 16.3 mmol/L      eGFR 95.7 mL/min/1.73     Narrative:      GFR Normal >60  Chronic Kidney Disease <60  Kidney Failure <15    The GFR formula is only valid for adults with stable renal function between ages 18 and 70.    Lipase [[id number]]  (Normal) Collected: 02/16/24 0958    Specimen: Blood Updated: 02/16/24 1028     Lipase 19 U/L     Lactic Acid, Plasma  [[id number]]  (Abnormal) Collected: 02/16/24 0958    Specimen: Blood Updated: 02/16/24 1058     Lactate 3.4 mmol/L     CBC Auto Differential [[id number]]  (Abnormal) Collected: 02/16/24 0958    Specimen: Blood Updated: 02/16/24 1012     WBC 7.53 10*3/mm3      RBC 4.79 10*6/mm3      Hemoglobin 16.6 g/dL      Hematocrit 49.0 %      .3 fL      MCH 34.7 pg      MCHC 33.9 g/dL      RDW 11.9 %      RDW-SD 45.2 fl      MPV 10.4 fL      Platelets 167 10*3/mm3      Neutrophil % 64.6 %      Lymphocyte % 23.9 %      Monocyte % 8.6 %      Eosinophil % 2.1 %      Basophil % 0.4 %      Immature Grans % 0.4 %      Neutrophils, Absolute 4.86 10*3/mm3      Lymphocytes, Absolute 1.80 10*3/mm3      Monocytes, Absolute 0.65 10*3/mm3      Eosinophils, Absolute 0.16 10*3/mm3      Basophils, Absolute 0.03 10*3/mm3      Immature Grans, Absolute 0.03 10*3/mm3      nRBC 0.0 /100 WBC     Occult Blood, Fecal By Immunoassay - Stool, Per Rectum [[id number]]  (Abnormal) Collected: 02/16/24 0959    Specimen: Stool from Per Rectum Updated: 02/16/24 1037     Occult Blood, Fecal by Immunoassay Positive             Imaging:    CT Abdomen Pelvis With Contrast    Result Date: 2/16/2024  PROCEDURE: CT ABDOMEN PELVIS W CONTRAST  COMPARISON: [facility], CT, ABD PEL W/O CONTRAST, 8/12/2017, 9:40.  INDICATIONS: Abdominal pain  TECHNIQUE: After obtaining the patient's consent, CT images were created with non-ionic intravenous contrast material.   PROTOCOL:   Standard imaging protocol performed    RADIATION:   DLP: 579.9mGy*cm   Automated exposure control was utilized to minimize radiation dose. CONTRAST: 100cc Isovue 370 I.V.  FINDINGS:  Lower chest demonstrates clear lung bases.  Heart size normal.  Extensive coronary artery calcifications.  No pericardial effusion or pleural effusion.  Aortic valve calcifications.  The liver is enlarged measuring 20 cm in length with severe hepatic steatosis.  Spleen and adrenal glands normal.  Pancreas  without findings of pancreatitis.  Gallbladder present without radiodense gallstone.  No pericholecystic inflammation or biliary dilatation.  Kidneys are symmetrically enhancing.  Nonobstructing bilateral nephrolithiasis.  Small left renal cyst.  No hydroureteronephrosis or obstructing calculus.  Urinary bladder is thin-walled.  Large amount of stool in the rectum consistent with constipation and rectal fecal impaction.  Normal appendix.  Portal vein, splenic vein, and superior mesenteric vein patent.  Moderate vascular calcifications of the aorta and branch vasculature without aortic aneurysm.  No aggressive osseous lesion or acute fracture.  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        1. Constipation and rectal fecal impaction. 2. Hepatomegaly and severe hepatic steatosis. 3. Bilateral nonobstructing nephrolithiasis. 4. Additional chronic findings above.      ROBYN GONSALEZ MD       Electronically Signed and Approved By: ROBYN GONSALEZ MD on 2/16/2024 at 12:58                Differential Diagnosis and Discussion:    Abdominal Pain: Based on the patient's signs and symptoms, I considered abdominal aortic aneurysm, small bowel obstruction, pancreatitis, acute cholecystitis, acute appendecitis, peptic ulcer disease, gastritis, colitis, endocrine disorders, irritable bowel syndrome and other differential diagnosis an etiology of the patient's abdominal pain.    All labs were reviewed and interpreted by me.    MDM  Number of Diagnoses or Management Options  Constipation, unspecified constipation type  Fecal impaction  Diagnosis management comments: After physical disimpaction, the patient was given a soapsuds enema and he had significant relief after a voluminous bowel movement.       Amount and/or Complexity of Data Reviewed  Clinical lab tests: reviewed  Tests in the radiology section of CPT®: reviewed                 Patient Care Considerations:    CHEST X-RAY: I considered ordering a chest x-ray however  the patient has no pulmonary symptoms.      Consultants/Shared Management Plan:    None    Social Determinants of Health:    Patient has presented with family members who are responsible, reliable and will ensure follow up care.      Disposition and Care Coordination:    Discharged: The patient is suitable and stable for discharge with no need for consideration of admission.    I have explained discharge medications and the need for follow up with the patient/caretakers. This was also printed in the discharge instructions. Patient was discharged with the following medications and follow up:      Medication List        New Prescriptions      docusate sodium 100 MG capsule  Commonly known as: COLACE  Take 1 capsule by mouth 2 (Two) Times a Day As Needed for Constipation.     polyethylene glycol 17 GM/SCOOP powder  Commonly known as: MIRALAX  Take 17 g by mouth Daily.               Where to Get Your Medications        These medications were sent to [id number] - [place], [place] - [address] AT [facility] - [phone] [facility] [phone] FX  [address] [place] [address]      Phone: [phone]   docusate sodium 100 MG capsule  polyethylene glycol 17 GM/SCOOP powder      Your primary care provider    In 3 days         Final diagnoses:   Fecal impaction   Constipation, unspecified constipation type        ED Disposition       ED Disposition   Discharge    Condition   Stable    Comment   --               This medical record created using voice recognition software.             Raman Barboza DO  02/16/24 [id number]

## 2024-08-26 ENCOUNTER — OFFICE VISIT (OUTPATIENT)
Dept: CARDIOLOGY | Facility: CLINIC | Age: 74
End: 2024-08-26
Payer: MEDICARE

## 2024-08-26 VITALS
DIASTOLIC BLOOD PRESSURE: 96 MMHG | HEIGHT: 70 IN | HEART RATE: 98 BPM | BODY MASS INDEX: 26.57 KG/M2 | WEIGHT: 185.6 LBS | SYSTOLIC BLOOD PRESSURE: 170 MMHG

## 2024-08-26 DIAGNOSIS — R01.1 HEART MURMUR: Primary | ICD-10-CM

## 2024-08-26 DIAGNOSIS — Z98.61 CAD S/P PERCUTANEOUS CORONARY ANGIOPLASTY: ICD-10-CM

## 2024-08-26 DIAGNOSIS — I25.10 CAD S/P PERCUTANEOUS CORONARY ANGIOPLASTY: ICD-10-CM

## 2024-08-26 DIAGNOSIS — E78.2 MIXED DYSLIPIDEMIA: ICD-10-CM

## 2024-08-26 DIAGNOSIS — R42 DIZZINESS: ICD-10-CM

## 2024-08-26 DIAGNOSIS — I10 ESSENTIAL HYPERTENSION: ICD-10-CM

## 2024-08-26 PROCEDURE — G2211 COMPLEX E/M VISIT ADD ON: HCPCS

## 2024-08-26 PROCEDURE — 99214 OFFICE O/P EST MOD 30 MIN: CPT

## 2024-08-26 PROCEDURE — 1159F MED LIST DOCD IN RCRD: CPT

## 2024-08-26 PROCEDURE — 1160F RVW MEDS BY RX/DR IN RCRD: CPT

## 2024-08-26 RX ORDER — METOPROLOL SUCCINATE 25 MG/1
25 TABLET, EXTENDED RELEASE ORAL DAILY
Qty: 90 TABLET | Refills: 3 | Status: SHIPPED | OUTPATIENT
Start: 2024-08-26

## 2024-08-26 RX ORDER — LOSARTAN POTASSIUM 50 MG/1
50 TABLET ORAL DAILY
Qty: 90 TABLET | Refills: 3 | Status: SHIPPED | OUTPATIENT
Start: 2024-08-26

## 2024-08-26 RX ORDER — ASPIRIN 81 MG/1
TABLET ORAL
Qty: 90 TABLET | Refills: 3 | Status: SHIPPED | OUTPATIENT
Start: 2024-08-26

## 2024-08-26 RX ORDER — ROSUVASTATIN CALCIUM 20 MG/1
20 TABLET, COATED ORAL NIGHTLY
Qty: 90 TABLET | Refills: 3 | Status: SHIPPED | OUTPATIENT
Start: 2024-08-26

## 2024-08-26 NOTE — PROGRESS NOTES
Chief Complaint  Myocardial Infarction and Follow-up (6 mo f/u. )    Subjective        History of Present Illness  Lance Brambila presents to [facility] for follow up.   Patient is a 73-year-old male with past medical history outlined below, significant for hypertension, hyperlipidemia, coronary artery disease status post PCI in May 2023 after ACS in [place].  He presents for routine follow-up.  He is complaining of significant dizziness and lightheadedness with position changes.  He denies chest pain or discomfort, palpitations, dyspnea, edema or syncope.    Past Medical History:   Diagnosis Date    Aftercare 08/03/2015    FOLLOWING LEFT KNEE SCOPE    BPH without urinary obstruction 06/14/2018    CAD (coronary artery disease)     Current tear knee, medial meniscus 07/14/2015    MI (myocardial infarction) 03/2006    Nocturia 06/14/2018    PUD (peptic ulcer disease)     Renal calculus or stone     Status post musculoskeletal system surgery 09/18/2015    LEFT KNEE SCOPE    Syncopal episodes 10/22/2020       ALLERGY  No Known Allergies     Past Surgical History:   Procedure Laterality Date    CARDIAC CATHETERIZATION      COLONOSCOPY  2007    CORONARY ANGIOPLASTY WITH STENT PLACEMENT      CYSTOSCOPY      INGUINAL HERNIA REPAIR      PROSTATE LASER ABLATION/ENUCLEATION      HOLMIUM LITHOTRIPSY        Social History     Socioeconomic History    Marital status:     Number of children: 3   Tobacco Use    Smoking status: Former    Smokeless tobacco: Never    Tobacco comments:     SMOKED FROM AGE 20 TO 40 FOR 21-30 YEARS   Vaping Use    Vaping status: Never Used   Substance and Sexual Activity    Alcohol use: Yes     Alcohol/week: 12.0 standard drinks of alcohol     Types: 12 Cans of beer per week     Comment: PER WEEK    Drug use: Never    Sexual activity: Defer       Family History   Problem Relation Age of Onset    Kidney nephrosis Mother     Arthritis Mother     Cancer Father      "    Current Outpatient Medications on File Prior to Visit   Medication Sig    docusate sodium (COLACE) 100 MG capsule Take 1 capsule by mouth 2 (Two) Times a Day As Needed for Constipation.    Multivitamin tablet tablet Take 1 tablet by mouth Daily.    polyethylene glycol (MIRALAX) 17 GM/SCOOP powder Take 17 g by mouth Daily.    [DISCONTINUED] aspirin 81 MG EC tablet One tablet daile    [DISCONTINUED] losartan (COZAAR) 25 MG tablet Take 1 tablet by mouth Daily.    [DISCONTINUED] metoprolol succinate XL (TOPROL-XL) 25 MG 24 hr tablet Take 1 tablet by mouth Daily.    [DISCONTINUED] rosuvastatin (CRESTOR) 20 MG tablet Take 1 tablet by mouth Every Night.    [DISCONTINUED] ticagrelor (BRILINTA) 90 MG tablet tablet Take 1 tablet by mouth Every 12 (Twelve) Hours.     No current facility-administered medications on file prior to visit.       Objective   Vitals:    08/26/24 0924   BP: 170/96   Pulse: 98   Weight: 84.2 kg (185 lb 9.6 oz)   Height: 177.8 cm (70\")       Physical Exam  Constitutional:       General: He is awake. He is not in acute distress.     Appearance: Normal appearance.   HENT:      Head: Normocephalic.      Nose: Nose normal. No congestion.   Eyes:      Extraocular Movements: Extraocular movements intact.      Conjunctiva/sclera: Conjunctivae normal.      Pupils: Pupils are equal, round, and reactive to light.   Neck:      Thyroid: No thyromegaly.      Vascular: No JVD.   Cardiovascular:      Rate and Rhythm: Normal rate and regular rhythm.      Chest Wall: PMI is not displaced.      Pulses: Normal pulses.      Heart sounds: S1 normal and S2 normal. Murmur heard.      No friction rub. No gallop. No S3 or S4 sounds.   Pulmonary:      Effort: Pulmonary effort is normal.      Breath sounds: Normal breath sounds. No wheezing, rhonchi or rales.   Abdominal:      General: Bowel sounds are normal.      Palpations: Abdomen is soft.      Tenderness: There is no abdominal tenderness.   Musculoskeletal:      Cervical " back: No tenderness.      Right lower leg: No edema.      Left lower leg: No edema.   Lymphadenopathy:      Cervical: No cervical adenopathy.   Skin:     General: Skin is warm and dry.      Capillary Refill: Capillary refill takes less than 2 seconds.      Coloration: Skin is not cyanotic.      Findings: No petechiae or rash.      Nails: There is no clubbing.   Neurological:      Mental Status: He is alert.   Psychiatric:         Mood and Affect: Mood normal.         Behavior: Behavior is cooperative.           Result Review     The following data was reviewed by RAMON Andrade on 08/26/24.      CMP          2/16/2024    09:58   CMP   Glucose 138    BUN 15    Creatinine 0.77    EGFR 95.7    Sodium 140    Potassium 3.4    Chloride 102    Calcium 9.5    Total Protein 7.8    Albumin 4.2    Globulin 3.6    Total Bilirubin 1.0    Alkaline Phosphatase 84    AST (SGOT) 89    ALT (SGPT) 84    Albumin/Globulin Ratio 1.2    BUN/Creatinine Ratio 19.5    Anion Gap 16.3      CBC w/diff          2/16/2024    09:58   CBC w/Diff   WBC 7.53    RBC 4.79    Hemoglobin 16.6    Hematocrit 49.0    .3    MCH 34.7    MCHC 33.9    RDW 11.9    Platelets 167    Neutrophil Rel % 64.6    Immature Granulocyte Rel % 0.4    Lymphocyte Rel % 23.9    Monocyte Rel % 8.6    Eosinophil Rel % 2.1    Basophil Rel % 0.4               No results found for this or any previous visit.          Procedures    Assessment & Plan  Diagnoses and all orders for this visit:    1. Heart murmur (Primary)  -     Adult Transthoracic Echo Complete w/ Color, Spectral and Contrast if necessary per protocol; Future    2. Essential hypertension  -     metoprolol succinate XL (TOPROL-XL) 25 MG 24 hr tablet; Take 1 tablet by mouth Daily.  Dispense: 90 tablet; Refill: 3    3. CAD S/P percutaneous coronary angioplasty    4. Mixed dyslipidemia    5. Dizziness    Other orders  -     losartan (COZAAR) 50 MG tablet; Take 1 tablet by mouth Daily.  Dispense: 90  tablet; Refill: 3  -     aspirin 81 MG EC tablet; One tablet daile  Dispense: 90 tablet; Refill: 3  -     rosuvastatin (CRESTOR) 20 MG tablet; Take 1 tablet by mouth Every Night.  Dispense: 90 tablet; Refill: 3          1.  Patient has a grade 3 systolic murmur on exam.  Recent echocardiogram showed calcification of the aortic valve but no stenosis or regurgitation.  Repeat echocardiogram will be done to evaluate this murmur.  He is also experiencing significant dizziness and lightheadedness.  He has not had any syncopal episodes.  Further recommendations pending test results.  2.  Blood pressure is elevated in the office today and he reports elevated blood pressure readings at home.  Increase losartan to 50 mg daily.  He was advised to keep a blood pressure log for 1 week prior to increasing his dose as I checked orthostatic blood pressures on him and his blood pressure did drop significantly from 165/102 lying down to 146/93 standing up.  I do not want to lower his blood pressure so much that he passes out when he stands up.  Advised him I am cautiously increasing his blood pressure medication today since he told me his blood pressures were running in the 150s and 160s at home.  3.  Status post PCI in May 2023.  Patient notes no angina or anginal-like symptoms.  Discontinue Brilinta.  Continue aspirin.  Continue metoprolol.  4.  Continue statin therapy.  Plan to check a lipid panel at next follow-up visit.  5.  Patient did have a significant drop in his blood pressure when going from lying to standing position.  He was advised to drastically increase his water and salt intake.  Will reassess at next follow-up.  Patient may benefit from fludrocortisone.        The medical services provided during this encounter are part of ongoing care related to this patient's single serious condition or complex condition.      Follow Up   Return in about 6 weeks (around 10/7/2024) for With RAMON Aguila.    Patient was  given instructions and counseling regarding his condition or for health maintenance advice. Please see specific information pulled into the AVS if appropriate.     Kathy Casas, RAMON  08/26/24  09:58 EDT    Dictated Utilizing Dragon Dictation

## 2024-10-07 ENCOUNTER — OFFICE VISIT (OUTPATIENT)
Dept: CARDIOLOGY | Facility: CLINIC | Age: 74
End: 2024-10-07
Payer: MEDICARE

## 2024-10-07 VITALS
HEART RATE: 72 BPM | WEIGHT: 187 LBS | SYSTOLIC BLOOD PRESSURE: 150 MMHG | DIASTOLIC BLOOD PRESSURE: 91 MMHG | HEIGHT: 70 IN | BODY MASS INDEX: 26.77 KG/M2

## 2024-10-07 DIAGNOSIS — E78.2 MIXED DYSLIPIDEMIA: Primary | ICD-10-CM

## 2024-10-07 DIAGNOSIS — R01.1 HEART MURMUR: ICD-10-CM

## 2024-10-07 DIAGNOSIS — Z98.61 CAD S/P PERCUTANEOUS CORONARY ANGIOPLASTY: ICD-10-CM

## 2024-10-07 DIAGNOSIS — R42 DIZZINESS: ICD-10-CM

## 2024-10-07 DIAGNOSIS — F17.200 SMOKING: ICD-10-CM

## 2024-10-07 DIAGNOSIS — I25.10 CAD S/P PERCUTANEOUS CORONARY ANGIOPLASTY: ICD-10-CM

## 2024-10-07 DIAGNOSIS — I10 ESSENTIAL HYPERTENSION: ICD-10-CM

## 2024-10-07 PROCEDURE — G2211 COMPLEX E/M VISIT ADD ON: HCPCS

## 2024-10-07 PROCEDURE — 1160F RVW MEDS BY RX/DR IN RCRD: CPT

## 2024-10-07 PROCEDURE — 1159F MED LIST DOCD IN RCRD: CPT

## 2024-10-07 PROCEDURE — 99214 OFFICE O/P EST MOD 30 MIN: CPT

## 2024-10-07 NOTE — PROGRESS NOTES
Chief Complaint  Follow-up (F./U post testing which is incomplete. )    Subjective        History of Present Illness  Lance Brambila presents to [facility] CARDIOLOGY for follow up.   Patient is a 74-year-old male with past medical history outlined below, significant for hypertension, hyperlipidemia, coronary artery disease status post PCI in May 2023 after ACS in [place].  Patient has been working on increasing his water and salt intake to help with his dizziness and lightheadedness and notes significant improvement in his symptoms.  He really has no complaints today.  He denies chest pain or discomfort, dyspnea, palpitations, edema.    Past Medical History:   Diagnosis Date    Aftercare 08/03/2015    FOLLOWING LEFT KNEE SCOPE    BPH without urinary obstruction 06/14/2018    CAD (coronary artery disease)     Current tear knee, medial meniscus 07/14/2015    MI (myocardial infarction) 03/2006    Nocturia 06/14/2018    PUD (peptic ulcer disease)     Renal calculus or stone     Status post musculoskeletal system surgery 09/18/2015    LEFT KNEE SCOPE    Syncopal episodes 10/22/2020       ALLERGY  No Known Allergies     Past Surgical History:   Procedure Laterality Date    CARDIAC CATHETERIZATION      COLONOSCOPY  2007    CORONARY ANGIOPLASTY WITH STENT PLACEMENT      CYSTOSCOPY      INGUINAL HERNIA REPAIR      PROSTATE LASER ABLATION/ENUCLEATION      HOLMIUM LITHOTRIPSY        Social History     Socioeconomic History    Marital status:     Number of children: 3   Tobacco Use    Smoking status: Former    Smokeless tobacco: Never    Tobacco comments:     SMOKED FROM AGE 20 TO 40 FOR 21-30 YEARS   Vaping Use    Vaping status: Never Used   Substance and Sexual Activity    Alcohol use: Yes     Alcohol/week: 12.0 standard drinks of alcohol     Types: 12 Cans of beer per week     Comment: PER WEEK    Drug use: Never    Sexual activity: Defer       Family History   Problem Relation Age of Onset     "Kidney nephrosis Mother     Arthritis Mother     Cancer Father         Current Outpatient Medications on File Prior to Visit   Medication Sig    aspirin 81 MG EC tablet One tablet daile    docusate sodium (COLACE) 100 MG capsule Take 1 capsule by mouth 2 (Two) Times a Day As Needed for Constipation.    losartan (COZAAR) 50 MG tablet Take 1 tablet by mouth Daily.    metoprolol succinate XL (TOPROL-XL) 25 MG 24 hr tablet Take 1 tablet by mouth Daily.    Multivitamin tablet tablet Take 1 tablet by mouth Daily.    polyethylene glycol (MIRALAX) 17 GM/SCOOP powder Take 17 g by mouth Daily.    rosuvastatin (CRESTOR) 20 MG tablet Take 1 tablet by mouth Every Night.     No current facility-administered medications on file prior to visit.       Objective   Vitals:    10/07/24 0835   BP: 150/91   Pulse: 72   Weight: 84.8 kg (187 lb)   Height: 177.8 cm (70\")       Physical Exam  Constitutional:       General: He is awake. He is not in acute distress.     Appearance: Normal appearance.   HENT:      Head: Normocephalic.      Nose: Nose normal. No congestion.   Eyes:      Extraocular Movements: Extraocular movements intact.      Conjunctiva/sclera: Conjunctivae normal.      Pupils: Pupils are equal, round, and reactive to light.   Neck:      Thyroid: No thyromegaly.      Vascular: No JVD.   Cardiovascular:      Rate and Rhythm: Normal rate and regular rhythm.      Chest Wall: PMI is not displaced.      Pulses: Normal pulses.      Heart sounds: S1 normal and S2 normal. Murmur heard.      No friction rub. No gallop. No S3 or S4 sounds.   Pulmonary:      Effort: Pulmonary effort is normal.      Breath sounds: Normal breath sounds. No wheezing, rhonchi or rales.   Abdominal:      General: Bowel sounds are normal.      Palpations: Abdomen is soft.      Tenderness: There is no abdominal tenderness.   Musculoskeletal:      Cervical back: No tenderness.      Right lower leg: No edema.      Left lower leg: No edema.   Lymphadenopathy:      " Cervical: No cervical adenopathy.   Skin:     General: Skin is warm and dry.      Capillary Refill: Capillary refill takes less than 2 seconds.      Coloration: Skin is not cyanotic.      Findings: No petechiae or rash.      Nails: There is no clubbing.   Neurological:      Mental Status: He is alert.   Psychiatric:         Mood and Affect: Mood normal.         Behavior: Behavior is cooperative.           Result Review     The following data was reviewed by RAMON Andrade on 10/07/24.      CMP          2/16/2024    09:58   CMP   Glucose 138    BUN 15    Creatinine 0.77    EGFR 95.7    Sodium 140    Potassium 3.4    Chloride 102    Calcium 9.5    Total Protein 7.8    Albumin 4.2    Globulin 3.6    Total Bilirubin 1.0    Alkaline Phosphatase 84    AST (SGOT) 89    ALT (SGPT) 84    Albumin/Globulin Ratio 1.2    BUN/Creatinine Ratio 19.5    Anion Gap 16.3      CBC w/diff          2/16/2024    09:58   CBC w/Diff   WBC 7.53    RBC 4.79    Hemoglobin 16.6    Hematocrit 49.0    .3    MCH 34.7    MCHC 33.9    RDW 11.9    Platelets 167    Neutrophil Rel % 64.6    Immature Granulocyte Rel % 0.4    Lymphocyte Rel % 23.9    Monocyte Rel % 8.6    Eosinophil Rel % 2.1    Basophil Rel % 0.4               No results found for this or any previous visit.          Procedures    Assessment & Plan  Diagnoses and all orders for this visit:    1. Mixed dyslipidemia (Primary)  -     Lipid Panel; Future    2. Essential hypertension    3. Heart murmur    4. CAD S/P percutaneous coronary angioplasty    5. Dizziness    6. Smoking        1.  Continue statin therapy.  Will check a lipid panel.  2.  Blood pressure is mildly elevated in the office today but has improved since last office visit.  Patient's dizziness and lightheadedness have also improved.  Will reassess blood pressure at next follow-up visit.  He was advised to monitor his blood pressure at home and notify us if it remains elevated.  3.  Echocardiogram is  pending.  4.  Status post PCI in May 2023.  Patient has no angina or anginal-like symptoms.  Continue the aspirin.  5.  Patient's dizziness has resolved.  Continue with increased water and salt intake.  6.  Smoking cessation is advised.          The medical services provided during this encounter are part of ongoing care related to this patient's single serious condition or complex condition.      Follow Up   Return in about 6 weeks (around 11/18/2024) for With Dr. Toledo.    Patient was given instructions and counseling regarding his condition or for health maintenance advice. Please see specific information pulled into the AVS if appropriate.     Kathy Casas, APRN  10/07/24  08:41 EDT    Dictated Utilizing Dragon Dictation

## 2024-10-29 ENCOUNTER — HOSPITAL ENCOUNTER (OUTPATIENT)
Dept: CARDIOLOGY | Facility: HOSPITAL | Age: 74
Discharge: HOME | End: 2024-10-29
Payer: MEDICARE

## 2024-10-29 DIAGNOSIS — R01.1 HEART MURMUR: ICD-10-CM

## 2024-10-29 LAB
ASCENDING AORTA: 3.8 CM
BH CV ECHO MEAS - ACS: 1.46 CM
BH CV ECHO MEAS - AI P1/2T: 727 MSEC
BH CV ECHO MEAS - AO MAX PG: 52.8 MMHG
BH CV ECHO MEAS - AO MEAN PG: 32.8 MMHG
BH CV ECHO MEAS - AO ROOT DIAM: 2.8 CM
BH CV ECHO MEAS - AO V2 MAX: 362.8 CM/SEC
BH CV ECHO MEAS - AO V2 VTI: 75.7 CM
BH CV ECHO MEAS - EDV(MOD-SP2): 29.5 ML
BH CV ECHO MEAS - EDV(MOD-SP4): 39.9 ML
BH CV ECHO MEAS - EF(MOD-BP): 59.5 %
BH CV ECHO MEAS - EF(MOD-SP2): 62.4 %
BH CV ECHO MEAS - EF(MOD-SP4): 56 %
BH CV ECHO MEAS - ESV(MOD-SP2): 11.1 ML
BH CV ECHO MEAS - ESV(MOD-SP4): 17.5 ML
BH CV ECHO MEAS - IVS/LVPW: 0.75 CM
BH CV ECHO MEAS - IVSD: 0.9 CM
BH CV ECHO MEAS - LA DIMENSION: 3.3 CM
BH CV ECHO MEAS - LAT PEAK E' VEL: 8.3 CM/SEC
BH CV ECHO MEAS - LV MAX PG: 4.8 MMHG
BH CV ECHO MEAS - LV MEAN PG: 2.9 MMHG
BH CV ECHO MEAS - LV V1 MAX: 110 CM/SEC
BH CV ECHO MEAS - LV V1 VTI: 22.3 CM
BH CV ECHO MEAS - LVIDD: 4.3 CM
BH CV ECHO MEAS - LVIDS: 3 CM
BH CV ECHO MEAS - LVOT DIAM: 2 CM
BH CV ECHO MEAS - LVPWD: 1.2 CM
BH CV ECHO MEAS - MED PEAK E' VEL: 8.6 CM/SEC
BH CV ECHO MEAS - MV A MAX VEL: 117.3 CM/SEC
BH CV ECHO MEAS - MV DEC TIME: 217 SEC
BH CV ECHO MEAS - MV E MAX VEL: 59.2 CM/SEC
BH CV ECHO MEAS - MV E/A: 0.5
BH CV ECHO MEAS - TAPSE (>1.6): 1.64 CM
BH CV ECHO MEASUREMENTS AVERAGE E/E' RATIO: 7.01

## 2024-10-29 PROCEDURE — 93306 TTE W/DOPPLER COMPLETE: CPT

## 2024-11-18 ENCOUNTER — OFFICE VISIT (OUTPATIENT)
Dept: CARDIOLOGY | Facility: CLINIC | Age: 74
End: 2024-11-18
Payer: MEDICARE

## 2024-11-18 VITALS
WEIGHT: 187.2 LBS | SYSTOLIC BLOOD PRESSURE: 173 MMHG | DIASTOLIC BLOOD PRESSURE: 109 MMHG | BODY MASS INDEX: 26.8 KG/M2 | HEIGHT: 70 IN | HEART RATE: 75 BPM

## 2024-11-18 DIAGNOSIS — I35.0 AORTIC VALVE STENOSIS, ETIOLOGY OF CARDIAC VALVE DISEASE UNSPECIFIED: ICD-10-CM

## 2024-11-18 DIAGNOSIS — I10 ESSENTIAL HYPERTENSION: Primary | ICD-10-CM

## 2024-11-18 DIAGNOSIS — Z98.61 CAD S/P PERCUTANEOUS CORONARY ANGIOPLASTY: ICD-10-CM

## 2024-11-18 DIAGNOSIS — F17.200 SMOKING: ICD-10-CM

## 2024-11-18 DIAGNOSIS — E78.2 MIXED DYSLIPIDEMIA: ICD-10-CM

## 2024-11-18 DIAGNOSIS — I25.10 CAD S/P PERCUTANEOUS CORONARY ANGIOPLASTY: ICD-10-CM

## 2024-11-18 PROCEDURE — G2211 COMPLEX E/M VISIT ADD ON: HCPCS

## 2024-11-18 PROCEDURE — 1160F RVW MEDS BY RX/DR IN RCRD: CPT

## 2024-11-18 PROCEDURE — 1159F MED LIST DOCD IN RCRD: CPT

## 2024-11-18 PROCEDURE — 99214 OFFICE O/P EST MOD 30 MIN: CPT

## 2024-11-18 NOTE — PROGRESS NOTES
Chief Complaint  Hyperlipidemia and Follow-up (6 week f/u. )    Subjective        History of Present Illness  Lance Brambila presents to [facility]IOLOG[facility] for follow up.   History of Present Illness  74-year-old male with hypertension, hyperlipidemia, coronary artery disease, status post PCI in 05/2023 after acute coronary syndrome in [place], presents for follow-up. Recent echocardiogram showed normal LV systolic function with moderate to severe aortic valve stenosis.    Reports feeling well overall. Home blood pressure readings normal. Denies breathing difficulties, leg or foot swelling, dizziness, lightheadedness, chest pain, or fainting.      Past Medical History:   Diagnosis Date    Aftercare 08/03/2015    FOLLOWING LEFT KNEE SCOPE    BPH without urinary obstruction 06/14/2018    CAD (coronary artery disease)     Current tear knee, medial meniscus 07/14/2015    MI (myocardial infarction) 03/2006    Nocturia 06/14/2018    PUD (peptic ulcer disease)     Renal calculus or stone     Status post musculoskeletal system surgery 09/18/2015    LEFT KNEE SCOPE    Syncopal episodes 10/22/2020       ALLERGY  No Known Allergies     Past Surgical History:   Procedure Laterality Date    CARDIAC CATHETERIZATION      COLONOSCOPY  2007    CORONARY ANGIOPLASTY WITH STENT PLACEMENT      CYSTOSCOPY      INGUINAL HERNIA REPAIR      PROSTATE LASER ABLATION/ENUCLEATION      HOLMIUM LITHOTRIPSY        Social History     Socioeconomic History    Marital status:     Number of children: 3   Tobacco Use    Smoking status: Former    Smokeless tobacco: Never    Tobacco comments:     SMOKED FROM AGE 20 TO 40 FOR 21-30 YEARS   Vaping Use    Vaping status: Never Used   Substance and Sexual Activity    Alcohol use: Yes     Alcohol/week: 12.0 standard drinks of alcohol     Types: 12 Cans of beer per week     Comment: PER WEEK    Drug use: Never    Sexual activity: Defer       Family History   Problem Relation Age of  "Onset    Kidney nephrosis Mother     Arthritis Mother     Cancer Father         Current Outpatient Medications on File Prior to Visit   Medication Sig    aspirin 81 MG EC tablet One tablet daile    docusate sodium (COLACE) 100 MG capsule Take 1 capsule by mouth 2 (Two) Times a Day As Needed for Constipation.    losartan (COZAAR) 50 MG tablet Take 1 tablet by mouth Daily.    metoprolol succinate XL (TOPROL-XL) 25 MG 24 hr tablet Take 1 tablet by mouth Daily.    Multivitamin tablet tablet Take 1 tablet by mouth Daily.    polyethylene glycol (MIRALAX) 17 GM/SCOOP powder Take 17 g by mouth Daily.    rosuvastatin (CRESTOR) 20 MG tablet Take 1 tablet by mouth Every Night.     No current facility-administered medications on file prior to visit.       Objective   Vitals:    11/18/24 0831   BP: (!) 173/109   Pulse: 75   Weight: 84.9 kg (187 lb 3.2 oz)   Height: 177.8 cm (70\")       Physical Exam  Constitutional:       General: He is awake. He is not in acute distress.     Appearance: Normal appearance.   HENT:      Head: Normocephalic.      Nose: Nose normal. No congestion.   Eyes:      Extraocular Movements: Extraocular movements intact.      Conjunctiva/sclera: Conjunctivae normal.      Pupils: Pupils are equal, round, and reactive to light.   Neck:      Thyroid: No thyromegaly.      Vascular: No JVD.   Cardiovascular:      Rate and Rhythm: Normal rate and regular rhythm.      Chest Wall: PMI is not displaced.      Pulses: Normal pulses.      Heart sounds: S1 normal and S2 normal. Murmur heard.      No friction rub. No gallop. No S3 or S4 sounds.   Pulmonary:      Effort: Pulmonary effort is normal.      Breath sounds: Normal breath sounds. No wheezing, rhonchi or rales.   Abdominal:      General: Bowel sounds are normal.      Palpations: Abdomen is soft.      Tenderness: There is no abdominal tenderness.   Musculoskeletal:      Cervical back: No tenderness.      Right lower leg: No edema.      Left lower leg: No edema. "   Lymphadenopathy:      Cervical: No cervical adenopathy.   Skin:     General: Skin is warm and dry.      Capillary Refill: Capillary refill takes less than 2 seconds.      Coloration: Skin is not cyanotic.      Findings: No petechiae or rash.      Nails: There is no clubbing.   Neurological:      Mental Status: He is alert.   Psychiatric:         Mood and Affect: Mood normal.         Behavior: Behavior is cooperative.           Result Review     The following data was reviewed by RAMON Andrade on 11/18/24.      CMP          2/16/2024    09:58   CMP   Glucose 138    BUN 15    Creatinine 0.77    EGFR 95.7    Sodium 140    Potassium 3.4    Chloride 102    Calcium 9.5    Total Protein 7.8    Albumin 4.2    Globulin 3.6    Total Bilirubin 1.0    Alkaline Phosphatase 84    AST (SGOT) 89    ALT (SGPT) 84    Albumin/Globulin Ratio 1.2    BUN/Creatinine Ratio 19.5    Anion Gap 16.3      CBC w/diff          2/16/2024    09:58   CBC w/Diff   WBC 7.53    RBC 4.79    Hemoglobin 16.6    Hematocrit 49.0    .3    MCH 34.7    MCHC 33.9    RDW 11.9    Platelets 167    Neutrophil Rel % 64.6    Immature Granulocyte Rel % 0.4    Lymphocyte Rel % 23.9    Monocyte Rel % 8.6    Eosinophil Rel % 2.1    Basophil Rel % 0.4           Results for orders placed during the hospital encounter of 10/29/24    Adult Transthoracic Echo Complete w/ Color, Spectral and Contrast if necessary per protocol    Interpretation Summary    Technically difficult study with limited views.    Left ventricular ejection fraction appears to be 61 - 65%.  Appears mildly hyperdynamic.    Left ventricular diastolic function is consistent with (grade I) impaired relaxation and age.    Moderate to severe aortic valve stenosis is present.  Peak systolic velocity 3.9 m/s and MAC/mean pressure gradient 50/37 mmHg.      No results found for this or any previous visit.          Procedures    Assessment & Plan  1. Aortic stenosis:  - Echocardiogram shows  moderate to severe stenosis  - Advised to monitor for dizziness, lightheadedness, fainting, swelling, or breathing difficulties  - Repeat echocardiogram in 6 months or based on symptomology as needed  - Monitor blood pressure at home    2. Hypertension:  - Elevated today  - Monitor blood pressure at home    3.  Coronary artery disease:  -Patient notes no angina or anginal-like symptoms  -Continue to monitor clinically  -Continue daily aspirin    4.  Hyperlipidemia  -Continue statin therapy    5.  Smoking  -Cessation is advised    Follow-up:  - Return in 3 months    Follow Up   Return in about 3 months (around 2/18/2025) for With Dr. Toledo.    Patient was given instructions and counseling regarding his condition or for health maintenance advice. Please see specific information pulled into the AVS if appropriate.     RAMON Andrade  11/18/24  08:53 EST    Dictated Utilizing Dragon Dictation    Patient or patient representative verbalized consent for the use of Ambient Listening during the visit with  RAMON Andrade for chart documentation. 11/18/2024  08:55 EST

## 2025-03-04 ENCOUNTER — OFFICE VISIT (OUTPATIENT)
Dept: FAMILY MEDICINE CLINIC | Facility: CLINIC | Age: 75
End: 2025-03-04
Payer: MEDICARE

## 2025-03-04 VITALS
BODY MASS INDEX: 27.37 KG/M2 | HEART RATE: 85 BPM | OXYGEN SATURATION: 95 % | SYSTOLIC BLOOD PRESSURE: 145 MMHG | HEIGHT: 70 IN | DIASTOLIC BLOOD PRESSURE: 77 MMHG | WEIGHT: 191.2 LBS

## 2025-03-04 DIAGNOSIS — Z00.00 MEDICARE ANNUAL WELLNESS VISIT, SUBSEQUENT: Primary | ICD-10-CM

## 2025-03-04 DIAGNOSIS — Z11.59 NEED FOR HEPATITIS C SCREENING TEST: ICD-10-CM

## 2025-03-04 DIAGNOSIS — Z87.891 PERSONAL HISTORY OF NICOTINE DEPENDENCE: ICD-10-CM

## 2025-03-04 DIAGNOSIS — Z12.2 SCREENING FOR LUNG CANCER: ICD-10-CM

## 2025-03-04 DIAGNOSIS — Z12.11 SCREEN FOR COLON CANCER: ICD-10-CM

## 2025-03-04 DIAGNOSIS — Z13.220 SCREENING FOR LIPID DISORDERS: ICD-10-CM

## 2025-03-04 DIAGNOSIS — Z13.29 SCREENING FOR THYROID DISORDER: ICD-10-CM

## 2025-03-04 DIAGNOSIS — I10 PRIMARY HYPERTENSION: ICD-10-CM

## 2025-03-04 DIAGNOSIS — E78.2 MIXED HYPERLIPIDEMIA: ICD-10-CM

## 2025-03-04 PROCEDURE — G0439 PPPS, SUBSEQ VISIT: HCPCS

## 2025-03-04 PROCEDURE — 1126F AMNT PAIN NOTED NONE PRSNT: CPT

## 2025-03-04 PROCEDURE — 1170F FXNL STATUS ASSESSED: CPT

## 2025-03-04 NOTE — PROGRESS NOTES
Subjective   The ABCs of the Annual Wellness Visit  Medicare Wellness Visit      Lance Brambila is a 74 y.o. patient who presents for a Medicare Wellness Visit.    The following portions of the patient's history were reviewed and   updated as appropriate: allergies, current medications, past family history, past medical history, past social history, past surgical history, and problem list.    Compared to one year ago, the patient's physical   health is the same.  Compared to one year ago, the patient's mental   health is the same.    Recent Hospitalizations:  He was not admitted to the hospital during the last year.     Current Medical Providers:  Patient Care Team:  Mattie Lemos APRN as PCP - General (Nurse Practitioner)    Outpatient Medications Prior to Visit   Medication Sig Dispense Refill    aspirin 81 MG EC tablet One tablet daile 90 tablet 3    docusate sodium (COLACE) 100 MG capsule Take 1 capsule by mouth 2 (Two) Times a Day As Needed for Constipation. 30 capsule 0    losartan (COZAAR) 50 MG tablet Take 1 tablet by mouth Daily. 90 tablet 3    metoprolol succinate XL (TOPROL-XL) 25 MG 24 hr tablet Take 1 tablet by mouth Daily. 90 tablet 3    Multivitamin tablet tablet Take 1 tablet by mouth Daily. 30 tablet 3    polyethylene glycol (MIRALAX) 17 GM/SCOOP powder Take 17 g by mouth Daily. (Patient taking differently: Take 17 g by mouth Daily. Prn) 519 g 0    rosuvastatin (CRESTOR) 20 MG tablet Take 1 tablet by mouth Every Night. 90 tablet 3     No facility-administered medications prior to visit.     No opioid medication identified on active medication list. I have reviewed chart for other potential  high risk medication/s and harmful drug interactions in the elderly.      Aspirin is on active medication list. Aspirin use is indicated based on review of current medical condition/s. Pros and cons of this therapy have been discussed today. Benefits of this medication outweigh potential harm.  Patient  "has been encouraged to continue taking this medication.  .      Patient Active Problem List   Diagnosis    Atherosclerosis of coronary artery    BPH without urinary obstruction    MI (myocardial infarction)    Nocturia    PUD (peptic ulcer disease)    Kidney stone    Syncopal episodes    BPH with obstruction/lower urinary tract symptoms    Abnormal findings on diagnostic imaging of other parts of digestive tract    History of diarrhea    Colon cancer screening     Advance Care Planning Advance Directive is not on file.  ACP discussion was held with the patient during this visit. Patient does not have an advance directive, information provided.            Objective   Vitals:    25 1004   BP: 145/77   BP Location: Left arm   Patient Position: Sitting   Cuff Size: Large Adult   Pulse: 85   SpO2: 95%   Weight: 86.7 kg (191 lb 3.2 oz)   Height: 177.8 cm (70\")   PainSc: 0-No pain       Estimated body mass index is 27.43 kg/m² as calculated from the following:    Height as of this encounter: 177.8 cm (70\").    Weight as of this encounter: 86.7 kg (191 lb 3.2 oz).                Does the patient have evidence of cognitive impairment? No                                                                                                Health  Risk Assessment    Smoking Status:  Social History     Tobacco Use   Smoking Status Former    Current packs/day: 0.00    Average packs/day: 1 pack/day for 50.7 years (50.7 ttl pk-yrs)    Types: Cigarettes    Start date: 1973    Quit date: 2023    Years since quittin.5    Passive exposure: Past   Smokeless Tobacco Never   Tobacco Comments    SMOKED FROM AGE 20 TO 40 FOR 21-30 YEARS     Alcohol Consumption:  Social History     Substance and Sexual Activity   Alcohol Use Yes    Alcohol/week: 12.0 standard drinks of alcohol    Types: 12 Cans of beer per week    Comment: PER WEEK       Fall Risk Screen  STEADI Fall Risk Assessment was completed, and patient is at LOW risk for " falls.Assessment completed on:3/4/2025    Depression Screening   Little interest or pleasure in doing things? Not at all   Feeling down, depressed, or hopeless? Not at all   PHQ-2 Total Score 0      Health Habits and Functional and Cognitive Screening:      3/4/2025    10:00 AM   Functional & Cognitive Status   Do you have difficulty preparing food and eating? No   Do you have difficulty bathing yourself, getting dressed or grooming yourself? No   Do you have difficulty using the toilet? No   Do you have difficulty moving around from place to place? No   Do you have trouble with steps or getting out of a bed or a chair? No   Current Diet Well Balanced Diet   Dental Exam Not up to date   Eye Exam Not up to date   Exercise (times per week) 7 times per week   Current Exercises Include Walking   Do you need help using the phone?  No   Are you deaf or do you have serious difficulty hearing?  No   Do you need help to go to places out of walking distance? No   Do you need help shopping? No   Do you need help preparing meals?  No   Do you need help with housework?  No   Do you need help with laundry? No   Do you need help taking your medications? No   Do you need help managing money? No   Do you ever drive or ride in a car without wearing a seat belt? No   Have you felt unusual stress, anger or loneliness in the last month? No   Who do you live with? Alone   If you need help, do you have trouble finding someone available to you? No   Have you been bothered in the last four weeks by sexual problems? No   Do you have difficulty concentrating, remembering or making decisions? No           Age-appropriate Screening Schedule:  Refer to the list below for future screening recommendations based on patient's age, sex and/or medical conditions. Orders for these recommended tests are listed in the plan section. The patient has been provided with a written plan.    Health Maintenance List  Health Maintenance   Topic Date Due    LUNG  "CANCER SCREENING  Never done    HEPATITIS C SCREENING  Never done    ANNUAL WELLNESS VISIT  Never done    LIPID PANEL  10/29/2021    COLORECTAL CANCER SCREENING  02/16/2025    INFLUENZA VACCINE  03/31/2025 (Originally 7/1/2024)    COVID-19 Vaccine (2 - 2024-25 season) 03/04/2026 (Originally 9/1/2024)    Pneumococcal Vaccine 50+ (1 of 1 - PCV) 03/04/2026 (Originally 10/5/2002)    ZOSTER VACCINE (1 of 2) 03/04/2026 (Originally 10/5/2002)    BMI FOLLOWUP  08/26/2025    TDAP/TD VACCINES (2 - Td or Tdap) 03/29/2032    AAA SCREEN ONCE  Completed                                                                                                                                                CMS Preventative Services Quick Reference  Risk Factors Identified During Encounter  None Identified    The above risks/problems have been discussed with the patient.  Pertinent information has been shared with the patient in the After Visit Summary.  An After Visit Summary and PPPS were made available to the patient.    Follow Up:   Next Medicare Wellness visit to be scheduled in 1 year.         Additional E&M Note during same encounter follows:  Patient has additional, significant, and separately identifiable condition(s)/problem(s) that require work above and beyond the Medicare Wellness Visit     Chief Complaint  Medicare Wellness-subsequent and Establish Care    Subjective   Patient is a 74-year-old male who presents today to establish care.  Previous PCP was---.  They are due annual physical exam, to be done in office today.  Needs chronic condition management of CAD, HTN, HLD      CAD/HTN/HLD- he is currently established with Dr. Mathew who manages all of his medications for him.  Last cardio note from November \"hypertension, hyperlipidemia, coronary artery disease, status post PCI in 05/2023 after acute coronary syndrome in [place], presents for follow-up. Recent echocardiogram showed normal LV systolic function with moderate to " "severe aortic valve stenosis\". BP in office today is 145/77. Currently on Crestor, aspirin, losartan and metoprolol.     Colon screen- pt states that he had a colonoscopy completed at [facility] over 10 years ago and would like for another one to be done.    Shingles- will need to get at pharmacy due to medicare.     TDAP- will need to get at pharmacy due to medicare.     FLU and Pneumo-Patient declines vaccines today. Patient understands the risks of not having.     Patient is due labs. Orders placed at today's visit. Discussed with patient that these are fasting labs.     No other concerns or complaints at this time.  Patient denies any diarrhea, constipation, blood in stool, urinary urgency, frequency, or dysuria, chest pain, shortness of breath or syncope.             Lance is also being seen today for additional medical problem/s.    Review of Systems   All other systems reviewed and are negative.             Objective   Vital Signs:  /77 (BP Location: Left arm, Patient Position: Sitting, Cuff Size: Large Adult)   Pulse 85   Ht 177.8 cm (70\")   Wt 86.7 kg (191 lb 3.2 oz)   SpO2 95%   BMI 27.43 kg/m²   Physical Exam  Vitals reviewed.   Constitutional:       General: He is not in acute distress.     Appearance: He is not ill-appearing.   HENT:      Head: Normocephalic and atraumatic.   Eyes:      Conjunctiva/sclera: Conjunctivae normal.   Cardiovascular:      Rate and Rhythm: Normal rate and regular rhythm.      Heart sounds: Normal heart sounds.   Pulmonary:      Effort: Pulmonary effort is normal.      Breath sounds: Normal breath sounds.   Musculoskeletal:      Cervical back: Normal range of motion.   Neurological:      Mental Status: He is alert and oriented to person, place, and time.   Psychiatric:         Mood and Affect: Mood normal.         Behavior: Behavior normal.         Thought Content: Thought content normal.         Judgment: Judgment normal.         The following data was " reviewed by: RAMON Gimenez on 03/04/2025:  Data reviewed : Consultant notes cardiology notes.             Assessment and Plan Additional age appropriate preventative wellness advice topics were discussed during today's preventative wellness exam(some topics already addressed during AWV portion of the note above):    Physical Activity: Advised cardiovascular activity 150 minutes per week as tolerated. (example brisk walk for 30 minutes, 5 days a week).     Nutrition: Discussed nutrition plan with patient. Information shared in after visit summary. Goal is for a well balanced diet to enhance overall health.     Healthy Weight: Discussed current and goal BMI with patient. Steps to attain this goal discussed. Information shared in after visit summary.     Injury Prevention Discussion:  Information shared in after visit summary.                 Medicare annual wellness visit, subsequent    Orders:    Hepatitis C antibody; Future    Comprehensive Metabolic Panel; Future    CBC & Differential; Future    Lipid Panel; Future    TSH+Free T4; Future    Ambulatory Referral For Screening Colonoscopy     CT Chest Low Dose Cancer Screening WO; Future    Screening for lipid disorders    Orders:    Lipid Panel; Future    Screening for thyroid disorder    Orders:    TSH+Free T4; Future    Need for hepatitis C screening test    Orders:    Hepatitis C antibody; Future    Screen for colon cancer    Orders:    Ambulatory Referral For Screening Colonoscopy    Screening for lung cancer    Orders:     CT Chest Low Dose Cancer Screening WO; Future    Personal history of nicotine dependence    Orders:     CT Chest Low Dose Cancer Screening WO; Future    Mixed hyperlipidemia    continue to see cardio for management and contiune all medications they prescribe.        Primary hypertension    continue to see cardio for management and contiune all medications they prescribe.                Follow Up   No follow-ups on file.  Patient was  given instructions and counseling regarding his condition or for health maintenance advice. Please see specific information pulled into the AVS if appropriate.

## 2025-03-25 ENCOUNTER — OFFICE VISIT (OUTPATIENT)
Dept: CARDIOLOGY | Facility: CLINIC | Age: 75
End: 2025-03-25
Payer: MEDICARE

## 2025-03-25 VITALS
WEIGHT: 185 LBS | HEIGHT: 70 IN | BODY MASS INDEX: 26.48 KG/M2 | HEART RATE: 91 BPM | DIASTOLIC BLOOD PRESSURE: 85 MMHG | SYSTOLIC BLOOD PRESSURE: 121 MMHG

## 2025-03-25 DIAGNOSIS — E78.2 MIXED HYPERLIPIDEMIA: ICD-10-CM

## 2025-03-25 DIAGNOSIS — I35.0 NONRHEUMATIC AORTIC VALVE STENOSIS: Primary | ICD-10-CM

## 2025-03-25 DIAGNOSIS — I10 ESSENTIAL HYPERTENSION: ICD-10-CM

## 2025-03-25 PROCEDURE — 1159F MED LIST DOCD IN RCRD: CPT

## 2025-03-25 PROCEDURE — G2211 COMPLEX E/M VISIT ADD ON: HCPCS

## 2025-03-25 PROCEDURE — 1160F RVW MEDS BY RX/DR IN RCRD: CPT

## 2025-03-25 PROCEDURE — 99214 OFFICE O/P EST MOD 30 MIN: CPT

## 2025-03-25 RX ORDER — ROSUVASTATIN CALCIUM 20 MG/1
20 TABLET, COATED ORAL NIGHTLY
Qty: 90 TABLET | Refills: 3 | Status: SHIPPED | OUTPATIENT
Start: 2025-03-25

## 2025-03-25 RX ORDER — LOSARTAN POTASSIUM 50 MG/1
50 TABLET ORAL DAILY
Qty: 90 TABLET | Refills: 3 | Status: CANCELLED | OUTPATIENT
Start: 2025-03-25

## 2025-03-25 RX ORDER — METOPROLOL SUCCINATE 25 MG/1
25 TABLET, EXTENDED RELEASE ORAL DAILY
Qty: 90 TABLET | Refills: 3 | Status: SHIPPED | OUTPATIENT
Start: 2025-03-25

## 2025-03-25 RX ORDER — LOSARTAN POTASSIUM 25 MG/1
25 TABLET ORAL DAILY
Qty: 30 TABLET | Refills: 3 | Status: SHIPPED | OUTPATIENT
Start: 2025-03-25

## 2025-03-25 RX ORDER — ASPIRIN 81 MG/1
TABLET ORAL
Qty: 90 TABLET | Refills: 3 | Status: SHIPPED | OUTPATIENT
Start: 2025-03-25

## 2025-03-25 NOTE — PROGRESS NOTES
Chief Complaint  Follow-up, Hypertension, and Heart Murmur    Subjective        History of Present Illness  Lance Brambila presents to [facility] for follow up.   Patient is a 74-year-old male with past medical history significant for hypertension, hyperlipidemia, CAD status post PCI in May 2023 after ACS in [place], moderate to severe AAS who presents for routine follow-up.  He denies any chest pain or discomfort, dyspnea, palpitations, edema or syncope.  He does endorse episodes of dizziness which occur sporadically not necessarily with position changes.  He uses a cane for ambulation for fear of falling related to his dizziness episodes.    Past Medical History:   Diagnosis Date    Aftercare 2015    FOLLOWING LEFT KNEE SCOPE    BPH without urinary obstruction 2018    CAD (coronary artery disease)     Current tear knee, medial meniscus 2015    MI (myocardial infarction) 2006    Nocturia 2018    PUD (peptic ulcer disease)     Renal calculus or stone     Status post musculoskeletal system surgery 2015    LEFT KNEE SCOPE    Syncopal episodes 10/22/2020       ALLERGY  No Known Allergies     Past Surgical History:   Procedure Laterality Date    CARDIAC CATHETERIZATION      COLONOSCOPY      CORONARY ANGIOPLASTY WITH STENT PLACEMENT      CYSTOSCOPY      INGUINAL HERNIA REPAIR      PROSTATE LASER ABLATION/ENUCLEATION      HOLMIUM LITHOTRIPSY        Social History     Socioeconomic History    Marital status:     Number of children: 3   Tobacco Use    Smoking status: Former     Current packs/day: 0.00     Average packs/day: 1 pack/day for 50.7 years (50.7 ttl pk-yrs)     Types: Cigarettes     Start date: 1973     Quit date: 2023     Years since quittin.5     Passive exposure: Past    Smokeless tobacco: Never    Tobacco comments:     SMOKED FROM AGE 20 TO 40 FOR 21-30 YEARS   Vaping Use    Vaping status: Never Used   Substance and Sexual  "Activity    Alcohol use: Yes     Alcohol/week: 12.0 standard drinks of alcohol     Types: 12 Cans of beer per week     Comment: PER WEEK    Drug use: Never    Sexual activity: Defer       Family History   Problem Relation Age of Onset    Kidney nephrosis Mother     Arthritis Mother     Cancer Father         Current Outpatient Medications on File Prior to Visit   Medication Sig    docusate sodium (COLACE) 100 MG capsule Take 1 capsule by mouth 2 (Two) Times a Day As Needed for Constipation.    Multivitamin tablet tablet Take 1 tablet by mouth Daily.    polyethylene glycol (MIRALAX) 17 GM/SCOOP powder Take 17 g by mouth Daily. (Patient taking differently: Take 17 g by mouth Daily. Prn)    [DISCONTINUED] aspirin 81 MG EC tablet One tablet daile    [DISCONTINUED] losartan (COZAAR) 50 MG tablet Take 1 tablet by mouth Daily.    [DISCONTINUED] metoprolol succinate XL (TOPROL-XL) 25 MG 24 hr tablet Take 1 tablet by mouth Daily.    [DISCONTINUED] rosuvastatin (CRESTOR) 20 MG tablet Take 1 tablet by mouth Every Night.     No current facility-administered medications on file prior to visit.       Objective   Vitals:    03/25/25 0834   BP: 121/85   Pulse: 91   Weight: 83.9 kg (185 lb)   Height: 177.8 cm (70\")       Physical Exam  Constitutional:       General: He is awake. He is not in acute distress.     Appearance: Normal appearance.   HENT:      Head: Normocephalic.      Nose: Nose normal. No congestion.   Eyes:      Extraocular Movements: Extraocular movements intact.      Conjunctiva/sclera: Conjunctivae normal.      Pupils: Pupils are equal, round, and reactive to light.   Neck:      Thyroid: No thyromegaly.      Vascular: No JVD.   Cardiovascular:      Rate and Rhythm: Normal rate and regular rhythm.      Chest Wall: PMI is not displaced.      Pulses: Normal pulses.      Heart sounds: S1 normal and S2 normal. Murmur heard.      No friction rub. No gallop. No S3 or S4 sounds.   Pulmonary:      Effort: Pulmonary effort is " normal.      Breath sounds: Normal breath sounds. No wheezing, rhonchi or rales.   Abdominal:      General: Bowel sounds are normal.      Palpations: Abdomen is soft.      Tenderness: There is no abdominal tenderness.   Musculoskeletal:      Cervical back: No tenderness.      Right lower leg: No edema.      Left lower leg: No edema.   Lymphadenopathy:      Cervical: No cervical adenopathy.   Skin:     General: Skin is warm and dry.      Capillary Refill: Capillary refill takes less than 2 seconds.      Coloration: Skin is not cyanotic.      Findings: No petechiae or rash.      Nails: There is no clubbing.   Neurological:      Mental Status: He is alert.   Psychiatric:         Mood and Affect: Mood normal.         Behavior: Behavior is cooperative.           Result Review     The following data was reviewed by RAMON Andrade on 03/25/25.               Results for orders placed during the hospital encounter of 10/29/24    Adult Transthoracic Echo Complete w/ Color, Spectral and Contrast if necessary per protocol    Interpretation Summary    Technically difficult study with limited views.    Left ventricular ejection fraction appears to be 61 - 65%.  Appears mildly hyperdynamic.    Left ventricular diastolic function is consistent with (grade I) impaired relaxation and age.    Moderate to severe aortic valve stenosis is present.  Peak systolic velocity 3.9 m/s and MAC/mean pressure gradient 50/37 mmHg.      No results found for this or any previous visit.          Procedures      Assessment & Plan  Essential hypertension  Blood pressure is a little on the low side at home.  Decrease losartan to 25 mg daily.  Nonrheumatic aortic valve stenosis  Moderate to severe by most recent echocardiogram.  Repeat echocardiogram for follow-up on aortic stenosis.  Patient is mostly asymptomatic apart from some dizziness episodes.  He has no shortness of breath, orthopnea or edema.  Further recommendations pending test  results.  Mixed hyperlipidemia  Continue statin therapy.  Check a lipid panel.                   The medical services provided during this encounter are part of ongoing care related to this patient's single serious condition or complex condition.  Follow Up   Return in about 6 weeks (around 5/6/2025) for With Dr. Toledo.    Patient was given instructions and counseling regarding his condition or for health maintenance advice. Please see specific information pulled into the AVS if appropriate.     Kathy Casas, RAMON  03/25/25  08:58 EDT    Dictated Utilizing Dragon Dictation

## 2025-05-05 ENCOUNTER — TELEPHONE (OUTPATIENT)
Dept: CARDIOLOGY | Facility: CLINIC | Age: 75
End: 2025-05-05
Payer: MEDICARE

## 2025-05-05 NOTE — TELEPHONE ENCOUNTER
Attempted to call patient. Unable to leave VM.     RAMON Chavez wanted RN to check on patient has they were a no show for ECHO and f/u.

## 2025-08-06 ENCOUNTER — OFFICE VISIT (OUTPATIENT)
Dept: FAMILY MEDICINE CLINIC | Facility: CLINIC | Age: 75
End: 2025-08-06
Payer: MEDICARE

## 2025-08-06 VITALS
HEART RATE: 67 BPM | DIASTOLIC BLOOD PRESSURE: 75 MMHG | OXYGEN SATURATION: 97 % | BODY MASS INDEX: 26.92 KG/M2 | HEIGHT: 70 IN | SYSTOLIC BLOOD PRESSURE: 128 MMHG | WEIGHT: 188 LBS

## 2025-08-06 DIAGNOSIS — L08.9 SKIN INFECTION: Primary | ICD-10-CM

## 2025-08-06 DIAGNOSIS — F43.10 PTSD (POST-TRAUMATIC STRESS DISORDER): ICD-10-CM

## 2025-08-06 PROCEDURE — 1159F MED LIST DOCD IN RCRD: CPT

## 2025-08-06 PROCEDURE — 1160F RVW MEDS BY RX/DR IN RCRD: CPT

## 2025-08-06 PROCEDURE — 1126F AMNT PAIN NOTED NONE PRSNT: CPT

## 2025-08-06 PROCEDURE — 99214 OFFICE O/P EST MOD 30 MIN: CPT

## 2025-08-06 RX ORDER — SULFAMETHOXAZOLE AND TRIMETHOPRIM 800; 160 MG/1; MG/1
1 TABLET ORAL 2 TIMES DAILY
Qty: 14 TABLET | Refills: 0 | Status: SHIPPED | OUTPATIENT
Start: 2025-08-06 | End: 2025-08-13

## 2025-08-06 RX ORDER — HYDROXYZINE HYDROCHLORIDE 25 MG/1
25 TABLET, FILM COATED ORAL 3 TIMES DAILY PRN
Qty: 30 TABLET | Refills: 0 | Status: SHIPPED | OUTPATIENT
Start: 2025-08-06